# Patient Record
Sex: FEMALE | Race: WHITE | NOT HISPANIC OR LATINO | Employment: FULL TIME | URBAN - METROPOLITAN AREA
[De-identification: names, ages, dates, MRNs, and addresses within clinical notes are randomized per-mention and may not be internally consistent; named-entity substitution may affect disease eponyms.]

---

## 2017-10-02 ENCOUNTER — ALLSCRIPTS OFFICE VISIT (OUTPATIENT)
Dept: OTHER | Facility: OTHER | Age: 52
End: 2017-10-02

## 2017-10-03 NOTE — PROGRESS NOTES
Assessment  1  Acute sinusitis (461 9) (J01 90)    Plan  Acute sinusitis    · Cefuroxime Axetil 500 MG Oral Tablet; TAKE 1 TABLET EVERY 12 HOURS DAILY   · Follow-up PRN Evaluation and Treatment  Follow-up  Status: Complete  Done:  [id number]    Discussion/Summary    Nelle Fleischer is stable on exam  She is to f/u PRN  will switch her from Doxycycline to Ceftin x 10 days at this time; she can take OTC Cough and Cold Preps PRN, is to rest, hydrate well, etc    The patient was counseled regarding instructions for management,-risk factor reductions,-impressions,-importance of compliance with treatment  Possible side effects of new medications were reviewed with the patient/guardian today  The treatment plan was reviewed with the patient/guardian  The patient/guardian understands and agrees with the treatment plan      Chief Complaint  PT is being seen today due to having chest congestion, headache, ears clogged, lost appetite, stomach upset  some left over Doxycycline that she has at home, and not improved  She is dating a new boyfriend, exercising, eating healthier, losing weight, 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 - doing well  Just returned from a trip to [facility]  HISTORY of anaphylactic reactions with PCN; only some mild GI upset  History of Present Illness  HPI: As above  Review of Systems    Constitutional: as noted in HPI    ENT: as noted in HPI  Respiratory: as noted in HPI  Active Problems  1  Abdominal pain, lower (789 09) (R10 30)   2  Acute recurrent maxillary sinusitis (461 0) (J01 01)   3  Allergic rhinitis (477 9) (J30 9)   4  Back pain (724 5) (M54 9)   5  Hypertension (401 9) (I10)   6  Low back pain (724 2) (M54 5)   7  Osteoarthritis of both knees (715 96) (M17 0)   8  Sore throat (462) (J02 9)    Past Medical History  Active Problems And Past Medical History Reviewed: The active problems and past medical history were reviewed and updated today        Social History   · Never a smoker   · Social alcohol use (Z78 9)    Current Meds   1  Avonex Pen 30 MCG/0 5ML KIT; Therapy: (Recorded:30Urd5963) to Recorded   2  DiazePAM 5 MG Oral Tablet; TAKE 1 TABLET 3 TIMES DAILY AS NEEDED; Therapy: 74LGI8710 to (Evaluate:92Ajr6285); Last Rx:[id number] Ordered   3  Epinastine HCl - 0 05 % Ophthalmic Solution; INSTILL 2 DROP Twice daily; Therapy: (Sandra Care) to Recorded   4  Fish Oil 1000 MG Oral Capsule; TAKE 1 CAPSULE DAILY; Therapy: (Sandra Care) to Recorded   5  Glucosamine Chondr 1500 Complx Oral Capsule; TAKE 1 CAPSULE DAILY; Therapy: (Recorded:92Zwz0025) to Recorded   6  Losartan Potassium 50 MG Oral Tablet; TAKE 1 TABLET DAILY  Requested for:   50XXR1727; Last Rx:96Kjd1521 Ordered   7  Mometasone Furoate 50 MCG/ACT Nasal Suspension; USE 2 SPRAYS IN EACH   NOSTRIL DAILY IN THE MORNING (ON MFQ BACKORDER); Therapy: 89VKB8341 to (Anitha Sen)  Requested for: 22MJC2797; Last   Rx:[id number] Ordered   8  Montelukast Sodium 10 MG Oral Tablet; take 1 tablet by mouth every day; Therapy: 24IDB8142 to (Evaluate:58Wro8059)  Requested for: 74XYC9131; Last   Rx:75Inj2952 Ordered   9  Nasonex 50 MCG/ACT Nasal Suspension; USE 2 SPRAYS IN EACH NOSTRIL ONCE   DAILY; Therapy: (Sandra Care) to Recorded   10  ProAir  (90 Base) MCG/ACT Inhalation Aerosol Solution; INHALE 2 PUFFS    EVERY 4-6 HOURS AS NEEDED; Therapy: (Sandra Care) to Recorded   11  Vitamin B-12 1000 MCG Oral Tablet; TAKE 1 TABLET BY MOUTH DAILY AT 8AM    (SUPPLEMENT); Therapy: (Sandra Care) to Recorded   12  Vitamin C 1000 MG Oral Tablet; TAKE 1 TABLET DAILY; Therapy: (Sandra Care) to Recorded   13  Vitamin D 2000 UNIT Oral Capsule; TAKE 1 CAPSULE Every morning; Therapy: (Sandra Care) to Recorded   14  Zegerid  MG Oral Capsule; TAKE 1 CAPSULE DAILY; Therapy: (Recorded:23Ahu5841) to Recorded    The medication list was reviewed and updated today  Allergies  1  Azithromycin TABS   2  Bactrim   3  Levaquin   4  Penicillins  5  No Known Food Allergies    Vitals   Recorded: 60QKF5129 11:15AM   Temperature 97 1 F   Heart Rate 68   Respiration 16   Systolic 894   Diastolic 90   Height 5 ft 7 in   Weight 216 lb 4 oz   BMI Calculated 33 87   BSA Calculated 2 09     Physical Exam    Constitutional   General appearance: No acute distress, well appearing and well nourished  -NAD; pleasant  Eyes   Conjunctiva and lids: No swelling, erythema or discharge  Ears, Nose, Mouth, and Throat   External inspection of ears and nose: Normal     Otoscopic examination: Tympanic membranes translucent with normal light reflex  Canals patent without erythema  Nasal mucosa, septum, and turbinates: Abnormal  -NM boggy, with TTP over the bilateral maxillary sinuses  Oropharynx: Normal with no erythema, edema, exudate or lesions  Pulmonary   Respiratory effort: No increased work of breathing or signs of respiratory distress  Auscultation of lungs: Clear to auscultation  Cardiovascular   Auscultation of heart: Normal rate and rhythm, normal S1 and S2, without murmurs  Lymphatic   Palpation of lymph nodes in neck: No lymphadenopathy      Musculoskeletal   Gait and station: Normal     Psychiatric   Orientation to person, place, and time: Normal     Mood and affect: Normal          Signatures   Electronically signed by : Mookie Cmapa DO; Oct  2 2017 12:21PM EST                       (Author)

## 2017-11-13 ENCOUNTER — GENERIC CONVERSION - ENCOUNTER (OUTPATIENT)
Dept: OTHER | Facility: OTHER | Age: 52
End: 2017-11-13

## 2017-12-20 ENCOUNTER — ALLSCRIPTS OFFICE VISIT (OUTPATIENT)
Dept: OTHER | Facility: OTHER | Age: 52
End: 2017-12-20

## 2017-12-21 NOTE — PROGRESS NOTES
Assessment   1  Acute recurrent maxillary sinusitis (461 0) (J01 01)    Plan   Acute recurrent maxillary sinusitis    · Doxycycline Monohydrate 100 MG Oral Capsule; TAKE 1 CAPSULE TWICE DAILY    WITH FOOD   · Follow-up PRN Evaluation and Treatment  Follow-up  Status: Complete  Done:    [id number]    Discussion/Summary      Suhail Rodriguez is stable on exam  She is to f/u PRN  treat at this time with Doxycycline x 10 days, OTC Cough and Cold Preps PRN, rest, and good PO hydration  The patient was counseled regarding instructions for management,-- impressions,-- importance of compliance with treatment  Possible side effects of new medications were reviewed with the patient/guardian today  The treatment plan was reviewed with the patient/guardian  The patient/guardian understands and agrees with the treatment plan      Chief Complaint   PT is being seen today due to having lost of appetite, nausea, headache, tired, teeth hurt, nasal congestion, chest congestion, X 6 days  sick contacts at work  fevers  History of Present Illness   HPI: As above  Review of Systems        Constitutional: as noted in HPI       ENT: as noted in HPI  Respiratory: as noted in HPI  Active Problems   1  Abdominal pain, lower (789 09) (R10 30)   2  Acute recurrent maxillary sinusitis (461 0) (J01 01)   3  Acute sinusitis (461 9) (J01 90)   4  Allergic rhinitis (477 9) (J30 9)   5  Back pain (724 5) (M54 9)   6  Hypertension (401 9) (I10)   7  Low back pain (724 2) (M54 5)   8  Osteoarthritis of both knees (715 96) (M17 0)   9  Sore throat (462) (J02 9)    Past Medical History   Active Problems And Past Medical History Reviewed: The active problems and past medical history were reviewed and updated today  Social History    · Never a smoker   · Social alcohol use (Z78 9)    Current Meds    1  Avonex Pen 30 MCG/0 5ML KIT; Therapy: (Recorded:99Ywo0304) to Recorded   2   Cefuroxime Axetil 500 MG Oral Tablet; TAKE 1 TABLET EVERY 12 HOURS DAILY; Therapy: 03BYX9574 to (Evaluate:09Awo9256)  Requested for: 96Uay4056; Last     Rx:[id number] Ordered   3  DiazePAM 5 MG Oral Tablet; TAKE 1 TABLET 3 TIMES DAILY AS NEEDED; Therapy: 51OPB6712 to (Evaluate:57Hmg3318); Last Rx:[id number] Ordered   4  Epinastine HCl - 0 05 % Ophthalmic Solution; INSTILL 2 DROP Twice daily; Therapy: (Yolanda Irons) to Recorded   5  Fish Oil 1000 MG Oral Capsule; TAKE 1 CAPSULE DAILY; Therapy: (Yolanda Irons) to Recorded   6  Glucosamine Chondr 1500 Complx Oral Capsule; TAKE 1 CAPSULE DAILY; Therapy: (Recorded:33Baz8222) to Recorded   7  Losartan Potassium 50 MG Oral Tablet; TAKE 1 TABLET DAILY  Requested for:     98CCB5447; Last Rx:61Vbd3830 Ordered   8  Mometasone Furoate 50 MCG/ACT Nasal Suspension; USE 2 SPRAYS IN EACH     NOSTRIL DAILY IN THE MORNING (ON MFQ BACKORDER); Therapy: 44VTZ3314 to (Nell Laser)  Requested for: 21HAZ5052; Last     Rx:[id number] Ordered   9  Montelukast Sodium 10 MG Oral Tablet; take 1 tablet by mouth every day; Therapy: 00YMT2433 to (Evaluate:49Mbn2670)  Requested for: 65Aqr9414; Last     Rx:[id number] Ordered   10  Nasonex 50 MCG/ACT Nasal Suspension; USE 2 SPRAYS IN EACH NOSTRIL ONCE      DAILY; Therapy: (Yolanda Irons) to Recorded   11  ProAir  (90 Base) MCG/ACT Inhalation Aerosol Solution; INHALE 2 PUFFS      EVERY 4-6 HOURS AS NEEDED; Therapy: (Yolanda Irons) to Recorded   12  Vitamin B-12 1000 MCG Oral Tablet; TAKE 1 TABLET BY MOUTH DAILY AT 8AM      (SUPPLEMENT); Therapy: (Yolanda Irons) to Recorded   13  Vitamin C 1000 MG Oral Tablet; TAKE 1 TABLET DAILY; Therapy: (Yolanda Irons) to Recorded   14  Vitamin D 2000 UNIT Oral Capsule; TAKE 1 CAPSULE Every morning; Therapy: (Yolanda Irons) to Recorded   15  Zegerid  MG Oral Capsule; TAKE 1 CAPSULE DAILY;       Therapy: (Recorded:38Lyt6685) to Recorded     The medication list was reviewed and updated today  Allergies   1  Azithromycin TABS   2  Bactrim   3  Levaquin   4  Penicillins  5  No Known Food Allergies    Vitals    Recorded: 56Tcd0753 01:20PM   Temperature 97 7 F   Heart Rate 80   Respiration 16   Systolic 739   Diastolic 90   Height Unobtainable Yes   Weight Unobtainable Yes     Physical Exam        Constitutional      General appearance: No acute distress, well appearing and well nourished  -- NAD; pleasant  Eyes      Conjunctiva and lids: No swelling, erythema or discharge  Ears, Nose, Mouth, and Throat      External inspection of ears and nose: Normal        Otoscopic examination: Tympanic membranes translucent with normal light reflex  Canals patent without erythema  Nasal mucosa, septum, and turbinates: Abnormal  -- NM boggy, with TTP over the maxillary sinuses  Oropharynx: Normal with no erythema, edema, exudate or lesions  Pulmonary      Respiratory effort: No increased work of breathing or signs of respiratory distress  Auscultation of lungs: Clear to auscultation  Cardiovascular      Auscultation of heart: Normal rate and rhythm, normal S1 and S2, without murmurs  Lymphatic      Palpation of lymph nodes in neck: No lymphadenopathy         Musculoskeletal      Gait and station: Normal        Psychiatric      Orientation to person, place, and time: Normal        Mood and affect: Normal           Signatures    Electronically signed by : Dominic Hernandez DO; Dec 20 2017  2:16PM EST                       (Author)

## 2018-01-10 NOTE — RESULT NOTES
Verified Results  (1) THROAT CULTURE (CULTURE, UPPER RESPIRATORY) [id number] 12:00AM Felipe Granado     Test Name Result Flag Reference   Upper Respiratory Culture Final report     Result 1 Comment     Routine respiratory cristiane

## 2018-01-13 VITALS
WEIGHT: 216.25 LBS | BODY MASS INDEX: 33.94 KG/M2 | HEART RATE: 68 BPM | TEMPERATURE: 97.1 F | RESPIRATION RATE: 16 BRPM | SYSTOLIC BLOOD PRESSURE: 122 MMHG | HEIGHT: 67 IN | DIASTOLIC BLOOD PRESSURE: 90 MMHG

## 2018-01-23 ENCOUNTER — ALLSCRIPTS OFFICE VISIT (OUTPATIENT)
Dept: OTHER | Facility: OTHER | Age: 53
End: 2018-01-23

## 2018-01-23 VITALS
SYSTOLIC BLOOD PRESSURE: 140 MMHG | DIASTOLIC BLOOD PRESSURE: 90 MMHG | HEART RATE: 80 BPM | TEMPERATURE: 97.7 F | RESPIRATION RATE: 16 BRPM

## 2018-01-24 NOTE — MISCELLANEOUS
Message  Return to work or school:   Frannie Navarrete is under my professional care  She was seen in my office on 01/23/2018   She is able to return to work on  01/26/2018      PATIENT WAS SEEN IN OUR OFFICE 01/23/2018 MAY RETURN TO WORK 01/26/2018  DR JAMIE BRAR / LIZZETTE  Signatures   Electronically signed by :  Valorie García, ; Jan 23 2018  2:31PM EST                       (Author)

## 2018-01-29 ENCOUNTER — OFFICE VISIT (OUTPATIENT)
Dept: FAMILY MEDICINE CLINIC | Facility: CLINIC | Age: 53
End: 2018-01-29
Payer: COMMERCIAL

## 2018-01-29 VITALS
WEIGHT: 210.8 LBS | RESPIRATION RATE: 16 BRPM | SYSTOLIC BLOOD PRESSURE: 112 MMHG | DIASTOLIC BLOOD PRESSURE: 60 MMHG | HEART RATE: 80 BPM | BODY MASS INDEX: 33.02 KG/M2

## 2018-01-29 DIAGNOSIS — J01.01 ACUTE RECURRENT MAXILLARY SINUSITIS: Primary | ICD-10-CM

## 2018-01-29 DIAGNOSIS — J11.1 INFLUENZA: ICD-10-CM

## 2018-01-29 PROCEDURE — 99213 OFFICE O/P EST LOW 20 MIN: CPT | Performed by: FAMILY MEDICINE

## 2018-01-29 RX ORDER — OSELTAMIVIR PHOSPHATE 75 MG/1
1 CAPSULE ORAL 2 TIMES DAILY
COMMUNITY
Start: 2018-01-23 | End: 2019-08-26 | Stop reason: ALTCHOICE

## 2018-01-29 RX ORDER — EAR PLUGS
EACH OTIC (EAR)
COMMUNITY
End: 2022-02-14 | Stop reason: ALTCHOICE

## 2018-01-29 RX ORDER — MONTELUKAST SODIUM 10 MG/1
10 TABLET ORAL DAILY
Refills: 3 | COMMUNITY
Start: 2017-12-25 | End: 2019-08-26 | Stop reason: ALTCHOICE

## 2018-01-29 RX ORDER — ALBUTEROL SULFATE 90 UG/1
2 AEROSOL, METERED RESPIRATORY (INHALATION)
COMMUNITY
End: 2019-08-26 | Stop reason: ALTCHOICE

## 2018-01-29 RX ORDER — MOMETASONE FUROATE 50 UG/1
2 SPRAY, METERED NASAL DAILY
COMMUNITY
End: 2018-11-14 | Stop reason: SDUPTHER

## 2018-01-29 RX ORDER — EPINASTINE HCL 0.05 %
2 DROPS OPHTHALMIC (EYE) 2 TIMES DAILY
COMMUNITY

## 2018-01-29 RX ORDER — LOSARTAN POTASSIUM 50 MG/1
50 TABLET ORAL DAILY
Refills: 3 | COMMUNITY
Start: 2017-10-23 | End: 2018-02-19 | Stop reason: SDUPTHER

## 2018-01-29 RX ORDER — AZELASTINE 1 MG/ML
SPRAY, METERED NASAL
Refills: 11 | COMMUNITY
Start: 2017-11-24 | End: 2019-12-19 | Stop reason: SDUPTHER

## 2018-01-29 RX ORDER — INTERFERON BETA-1A 30MCG/.5ML
KIT INTRAMUSCULAR
COMMUNITY
Start: 2017-12-24

## 2018-01-29 RX ORDER — CEFUROXIME AXETIL 500 MG/1
1 TABLET ORAL EVERY 12 HOURS
COMMUNITY
Start: 2017-10-02 | End: 2018-01-29 | Stop reason: SDUPTHER

## 2018-01-29 RX ORDER — OMEPRAZOLE AND SODIUM BICARBONATE 40; 1100 MG/1; MG/1
1 CAPSULE ORAL DAILY
COMMUNITY
End: 2019-08-26 | Stop reason: ALTCHOICE

## 2018-01-29 RX ORDER — MULTIVIT WITH MINERALS/LUTEIN
1 TABLET ORAL DAILY
COMMUNITY
End: 2022-02-14 | Stop reason: ALTCHOICE

## 2018-01-29 RX ORDER — MULTIVIT-MIN/IRON/FOLIC ACID/K 18-600-40
1 CAPSULE ORAL
COMMUNITY
End: 2022-02-14 | Stop reason: ALTCHOICE

## 2018-01-29 RX ORDER — CEFUROXIME AXETIL 500 MG/1
500 TABLET ORAL EVERY 12 HOURS
Qty: 20 TABLET | Refills: 0 | Status: SHIPPED | OUTPATIENT
Start: 2018-01-29 | End: 2018-02-08

## 2018-01-29 RX ORDER — CHLORAL HYDRATE 500 MG
1 CAPSULE ORAL DAILY
COMMUNITY
End: 2022-02-14 | Stop reason: ALTCHOICE

## 2018-01-29 RX ORDER — DIAZEPAM 2 MG/1
TABLET ORAL
Refills: 2 | COMMUNITY
Start: 2018-01-14

## 2018-01-29 RX ORDER — MOMETASONE FUROATE 50 UG/1
SPRAY, METERED NASAL
Refills: 3 | COMMUNITY
Start: 2017-12-01 | End: 2019-08-26 | Stop reason: SDUPTHER

## 2018-01-29 RX ORDER — MONTELUKAST SODIUM 10 MG/1
1 TABLET ORAL DAILY
COMMUNITY
Start: 2016-05-10 | End: 2019-08-26 | Stop reason: ALTCHOICE

## 2018-01-29 RX ORDER — ESCITALOPRAM OXALATE 10 MG/1
20 TABLET ORAL DAILY
Refills: 0 | COMMUNITY
Start: 2017-11-25 | End: 2019-08-26 | Stop reason: ALTCHOICE

## 2018-01-29 NOTE — ASSESSMENT & PLAN NOTE
Post-influenza  Will treat again at this time with Ceftin x 10 days (has taken, and tolerated well previously), and OTC Cough and Cold preps PRN

## 2018-01-29 NOTE — ASSESSMENT & PLAN NOTE
Duke Walker is aware that the flu can take 1 - 2 weeks+ occasionally to make a full recovery  She is to continue to rest, hydrate well, and keep her diet bland for a few days (advancing slowly)  A note was provided for work

## 2018-01-29 NOTE — PROGRESS NOTES
Assessment/Plan:    Acute recurrent maxillary sinusitis  Post-influenza  Will treat again at this time with Ceftin x 10 days (has taken, and tolerated well previously), and OTC Cough and Cold preps PRN  Influenza  Resolving  Roxy Vega is aware that the flu can take 1 - 2 weeks+ occasionally to make a full recovery  She is to continue to rest, hydrate well, and keep her diet bland for a few days (advancing slowly)  A note was provided for work  Diagnoses and all orders for this visit:    Acute recurrent maxillary sinusitis  -     cefuroxime (CEFTIN) 500 mg tablet; Take 1 tablet (500 mg total) by mouth every 12 (twelve) hours for 10 days    Influenza    Other orders  -     omeprazole-sodium bicarbonate (ZEGERID)  MG per capsule; Take 1 capsule by mouth daily  -     Cholecalciferol (VITAMIN D) 2000 units CAPS; Take 1 capsule by mouth  -     Ascorbic Acid (VITAMIN C) 1000 MG tablet; Take 1 tablet by mouth daily  -     Cyanocobalamin (VITAMIN B-12) 1000 MCG/15ML LIQD; Take by mouth  -     albuterol (PROAIR HFA) 90 mcg/act inhaler; Inhale 2 puffs  -     oseltamivir (TAMIFLU) 75 mg capsule; Take 1 capsule by mouth Twice daily  -     mometasone (NASONEX) 50 mcg/act nasal spray; 2 sprays into each nostril daily  -     montelukast (SINGULAIR) 10 mg tablet; Take 1 tablet by mouth daily  -     montelukast (SINGULAIR) 10 mg tablet; Take 10 mg by mouth daily  -     mometasone (NASONEX) 50 mcg/act nasal spray; USE 2 SPRAYS IN EACH NOSTRIL DAILY IN THE MORNING  -     losartan (COZAAR) 50 mg tablet; Take 50 mg by mouth daily  -     AVONEX PREFILLED 30 MCG/0 5ML PSKT;   -     Glucosamine-Chondroit-Vit C-Mn (GLUCOSAMINE CHONDR 1500 COMPLX PO); Take 1 capsule by mouth daily  -     Omega-3 Fatty Acids (FISH OIL) 1,000 mg; Take 1 capsule by mouth daily  -     escitalopram (LEXAPRO) 10 mg tablet; Take 10 mg by mouth daily  -     Epinastine HCl 0 05 % ophthalmic solution;  Apply 2 drops to eye 2 (two) times a day  - diazepam (VALIUM) 2 mg tablet; TAKE 1/2 TABLET BY MOUTH EVERY MORNING PLUS 1 TAB AT BEDTIME  -     Interferon Beta-1a (AVONEX PEN) 30 MCG/0 5ML AJKT; Inject into the shoulder, thigh, or buttocks  -     azelastine (ASTELIN) 0 1 % nasal spray; USE 2 SPRAYS IN EACH NOSTRIL TWICE A DAY AS NEEDED  -     Discontinue: cefuroxime (CEFTIN) 500 mg tablet; Take 1 tablet by mouth every 12 (twelve) hours          Subjective:      Patient ID: Kianna Leahy is a 47 y o  female  Pt was treated for influenza 6 days ago -> only tolerated 5 doses of Tamiflu  Has had fatigue, muscle pains, diarrhea, headaches  Sinuses hurt now  Fevers resolved  Light sensitivity  Dizziness, body "heaviness "    No chills or fever now  The following portions of the patient's history were reviewed and updated as appropriate: past medical history  Review of Systems   Constitutional: Positive for fatigue  Negative for fever  HENT: Positive for sinus pain  Eyes: Positive for photophobia  Respiratory: Positive for cough and shortness of breath  Gastrointestinal: Positive for diarrhea  Genitourinary: Negative for dysuria and hematuria  Musculoskeletal: Positive for myalgias  No past medical history on file  Social History     Social History    Marital status:      Spouse name: N/A    Number of children: N/A    Years of education: N/A     Occupational History    Not on file  Social History Main Topics    Smoking status: Not on file    Smokeless tobacco: Not on file    Alcohol use Not on file    Drug use: Unknown    Sexual activity: Not on file     Other Topics Concern    Not on file     Social History Narrative    No narrative on file       Objective:     Physical Exam   Constitutional: She is oriented to person, place, and time  She appears well-developed and well-nourished  No distress     Shaunna Justice appears a little tired and pale today, but is NON-toxic appearing; she is speaking in full sentences  HENT:   Head: Normocephalic and atraumatic  Right Ear: External ear normal    Left Ear: External ear normal    Nose: Mucosal edema present  Right sinus exhibits maxillary sinus tenderness  Left sinus exhibits maxillary sinus tenderness  Mouth/Throat: Oropharynx is clear and moist  No oropharyngeal exudate  Eyes: Conjunctivae are normal    Neck: Normal range of motion  Neck supple  No thyromegaly present  Cardiovascular: Normal rate, regular rhythm and normal heart sounds  Exam reveals no gallop and no friction rub  No murmur heard  Pulmonary/Chest: Effort normal and breath sounds normal  No respiratory distress  She has no wheezes  She has no rales  She exhibits no tenderness  Abdominal: Soft  Bowel sounds are normal  She exhibits no distension and no mass  There is no rebound and no guarding  Mild TTP in the RUQ and LUQ, but no rebound TTP  Lymphadenopathy:     She has no cervical adenopathy  Neurological: She is alert and oriented to person, place, and time  Skin: She is not diaphoretic  Psychiatric: She has a normal mood and affect   Her behavior is normal  Judgment and thought content normal        Vitals:    01/29/18 1503   BP: 112/60   Pulse: 80   Resp: 16

## 2018-02-02 ENCOUNTER — TELEPHONE (OUTPATIENT)
Dept: FAMILY MEDICINE CLINIC | Facility: CLINIC | Age: 53
End: 2018-02-02

## 2018-02-02 NOTE — TELEPHONE ENCOUNTER
Likely nothing - just monitor for any new, ongoing symptoms, finish the antibiotic, rest, and hydrate well  Thanks    Lizbeth

## 2018-02-02 NOTE — TELEPHONE ENCOUNTER
Spoke to pt regarding suggestions  Will finishe ATB, rest, and hydrate - will work from home next week and F/U with us on how she's feeling

## 2018-02-19 DIAGNOSIS — I15.9 SECONDARY HYPERTENSION: Primary | ICD-10-CM

## 2018-02-19 RX ORDER — LOSARTAN POTASSIUM 50 MG/1
50 TABLET ORAL DAILY
Qty: 30 TABLET | Refills: 5 | Status: SHIPPED | OUTPATIENT
Start: 2018-02-19 | End: 2018-07-08 | Stop reason: SDUPTHER

## 2018-03-09 ENCOUNTER — TELEPHONE (OUTPATIENT)
Dept: FAMILY MEDICINE CLINIC | Facility: CLINIC | Age: 53
End: 2018-03-09

## 2018-03-09 DIAGNOSIS — R53.83 FATIGUE, UNSPECIFIED TYPE: Primary | ICD-10-CM

## 2018-03-09 DIAGNOSIS — J11.1 INFLUENZA: ICD-10-CM

## 2018-03-19 ENCOUNTER — TELEPHONE (OUTPATIENT)
Dept: FAMILY MEDICINE CLINIC | Facility: CLINIC | Age: 53
End: 2018-03-19

## 2018-07-08 DIAGNOSIS — I15.9 SECONDARY HYPERTENSION: ICD-10-CM

## 2018-07-09 RX ORDER — LOSARTAN POTASSIUM 50 MG/1
50 TABLET ORAL DAILY
Qty: 30 TABLET | Refills: 4 | Status: SHIPPED | OUTPATIENT
Start: 2018-07-09 | End: 2018-10-05 | Stop reason: SDUPTHER

## 2018-09-05 DIAGNOSIS — R79.89 ELEVATED SERUM CREATININE: ICD-10-CM

## 2018-09-05 DIAGNOSIS — E53.8 B12 DEFICIENCY: ICD-10-CM

## 2018-09-05 DIAGNOSIS — R53.83 FATIGUE, UNSPECIFIED TYPE: ICD-10-CM

## 2018-09-05 DIAGNOSIS — E55.9 VITAMIN D DEFICIENCY: ICD-10-CM

## 2018-09-05 DIAGNOSIS — D64.9 ANEMIA, UNSPECIFIED TYPE: ICD-10-CM

## 2018-09-05 DIAGNOSIS — Z13.1 SCREENING FOR DIABETES MELLITUS: Primary | ICD-10-CM

## 2018-09-27 ENCOUNTER — OFFICE VISIT (OUTPATIENT)
Dept: FAMILY MEDICINE CLINIC | Facility: CLINIC | Age: 53
End: 2018-09-27
Payer: COMMERCIAL

## 2018-09-27 VITALS
DIASTOLIC BLOOD PRESSURE: 82 MMHG | TEMPERATURE: 97.6 F | SYSTOLIC BLOOD PRESSURE: 122 MMHG | OXYGEN SATURATION: 98 % | BODY MASS INDEX: 32.01 KG/M2 | WEIGHT: 204.4 LBS | RESPIRATION RATE: 16 BRPM | HEART RATE: 74 BPM

## 2018-09-27 DIAGNOSIS — I10 ESSENTIAL HYPERTENSION: Chronic | ICD-10-CM

## 2018-09-27 DIAGNOSIS — D50.9 IRON DEFICIENCY ANEMIA, UNSPECIFIED IRON DEFICIENCY ANEMIA TYPE: Primary | ICD-10-CM

## 2018-09-27 DIAGNOSIS — R63.5 WEIGHT GAIN: Chronic | ICD-10-CM

## 2018-09-27 PROCEDURE — 99214 OFFICE O/P EST MOD 30 MIN: CPT | Performed by: FAMILY MEDICINE

## 2018-09-27 NOTE — PROGRESS NOTES
Assessment/Plan:    No problem-specific Assessment & Plan notes found for this encounter  Diagnoses and all orders for this visit:    Iron deficiency anemia, unspecified iron deficiency anemia type  Comments:  Post irreg menses, surgery - feeling better now though  Checking CBC and Ferritin  Pt reassured that her rash will fade away soon  Orders:  -     CBC and differential; Future  -     Ferritin; Future    Weight gain  Comments:  Checking TSH at this time  Pt to work on a lower carb diet, and regular exercise (already cleared by GYN)  Rechecking CMP/Cr/GFR - suspect due to NSAIDs  Orders:  -     CBC and differential; Future  -     TSH, 3rd generation with Free T4 reflex; Future    Essential hypertension  Comments:  Controlled on present management  Diet and exercise as above  Pt to avoid using NSAIDs and hydrate well - rechecking labs  Orders:  -     Comprehensive metabolic panel; Future  -     Lipid Panel with Direct LDL reflex; Future          Subjective:      Patient ID: Satnam Ahumada is a 47 y o  female  Citlali Gain just had issues with excessive vaginal bleeding  Her OB/GYN performed a D&C / Hysteroscope - had a large cervical; polyp with infection in the uterine cavity as well  Now doing better  Labs - H/H (mild anemia) at 11 1/34 3, Cr/GFR 1 32/42  She is feeling better  Had been taking a lot of OTC Aleve though, prior to her surgery  The following portions of the patient's history were reviewed and updated as appropriate: allergies, current medications, past family history, past social history, past surgical history and problem list     History reviewed  No pertinent past medical history  History reviewed  No pertinent surgical history        Current Outpatient Prescriptions:     albuterol (PROAIR HFA) 90 mcg/act inhaler, Inhale 2 puffs, Disp: , Rfl:     Ascorbic Acid (VITAMIN C) 1000 MG tablet, Take 1 tablet by mouth daily, Disp: , Rfl:     AVONEX PREFILLED 30 MCG/0 5ML PSKT, , Disp: , Rfl:     azelastine (ASTELIN) 0 1 % nasal spray, USE 2 SPRAYS IN EACH NOSTRIL TWICE A DAY AS NEEDED, Disp: , Rfl: 11    Cholecalciferol (VITAMIN D) 2000 units CAPS, Take 1 capsule by mouth, Disp: , Rfl:     Cyanocobalamin (VITAMIN B-12) 1000 MCG/15ML LIQD, Take by mouth, Disp: , Rfl:     diazepam (VALIUM) 2 mg tablet, TAKE 1/2 TABLET BY MOUTH EVERY MORNING PLUS 1 TAB AT BEDTIME, Disp: , Rfl: 2    Epinastine HCl 0 05 % ophthalmic solution, Apply 2 drops to eye 2 (two) times a day, Disp: , Rfl:     escitalopram (LEXAPRO) 10 mg tablet, Take 10 mg by mouth daily, Disp: , Rfl: 0    Glucosamine-Chondroit-Vit C-Mn (GLUCOSAMINE CHONDR 1500 COMPLX PO), Take 1 capsule by mouth daily, Disp: , Rfl:     Interferon Beta-1a (AVONEX PEN) 30 MCG/0 5ML AJKT, Inject into the shoulder, thigh, or buttocks, Disp: , Rfl:     losartan (COZAAR) 50 mg tablet, TAKE 1 TABLET (50 MG TOTAL) BY MOUTH DAILY, Disp: 30 tablet, Rfl: 4    mometasone (NASONEX) 50 mcg/act nasal spray, 2 sprays into each nostril daily, Disp: , Rfl:     mometasone (NASONEX) 50 mcg/act nasal spray, USE 2 SPRAYS IN EACH NOSTRIL DAILY IN THE MORNING, Disp: , Rfl: 3    montelukast (SINGULAIR) 10 mg tablet, Take 1 tablet by mouth daily, Disp: , Rfl:     montelukast (SINGULAIR) 10 mg tablet, Take 10 mg by mouth daily, Disp: , Rfl: 3    Omega-3 Fatty Acids (FISH OIL) 1,000 mg, Take 1 capsule by mouth daily, Disp: , Rfl:     omeprazole-sodium bicarbonate (ZEGERID)  MG per capsule, Take 1 capsule by mouth daily, Disp: , Rfl:     oseltamivir (TAMIFLU) 75 mg capsule, Take 1 capsule by mouth Twice daily, Disp: , Rfl:     Allergies   Allergen Reactions    Azithromycin GI Intolerance    Codeine     Levofloxacin Hives    Penicillins GI Intolerance    Sulfamethoxazole-Trimethoprim Hives    Tamiflu [Oseltamivir] GI Intolerance         Review of Systems   Constitutional: Negative for activity change     Respiratory: Negative for shortness of breath  Cardiovascular: Negative for chest pain  Gastrointestinal: Negative for abdominal pain and blood in stool  Genitourinary: Positive for menstrual problem  Objective:      /82 (BP Location: Right arm, Patient Position: Sitting, Cuff Size: Large)   Pulse 74   Temp 97 6 °F (36 4 °C) (Tympanic)   Resp 16   Wt 92 7 kg (204 lb 6 4 oz)   SpO2 98%   BMI 32 01 kg/m²          Physical Exam   Constitutional: She is oriented to person, place, and time  She appears well-developed and well-nourished  No distress  Pt in NAD; speaking in full sentences  Just came off Doxycycline - has slight, fading, photosensitivity rash to upper chest and bilateral forearms; no signs of secondary infection  HENT:   Head: Normocephalic and atraumatic  Right Ear: Hearing, tympanic membrane, external ear and ear canal normal    Left Ear: Hearing, tympanic membrane, external ear and ear canal normal    Mouth/Throat: Oropharynx is clear and moist  No oropharyngeal exudate  Eyes: Conjunctivae are normal    Neck: Normal range of motion  Neck supple  No thyromegaly present  Cardiovascular: Normal rate, regular rhythm and normal heart sounds  Exam reveals no gallop and no friction rub  No murmur heard  Pulmonary/Chest: Effort normal and breath sounds normal  No respiratory distress  She has no wheezes  She has no rales  Abdominal: Soft  Bowel sounds are normal  She exhibits no distension and no mass  There is no tenderness  There is no rebound and no guarding  Lymphadenopathy:     She has no cervical adenopathy  Neurological: She is alert and oriented to person, place, and time  Skin: She is not diaphoretic  Psychiatric: She has a normal mood and affect  Her behavior is normal  Judgment and thought content normal    Nursing note and vitals reviewed

## 2018-09-30 DIAGNOSIS — N18.30 CKD (CHRONIC KIDNEY DISEASE) STAGE 3, GFR 30-59 ML/MIN (HCC): Primary | ICD-10-CM

## 2018-09-30 LAB
ALBUMIN SERPL-MCNC: 4.2 G/DL (ref 3.5–5.5)
ALBUMIN/GLOB SERPL: 1.8 {RATIO} (ref 1.2–2.2)
ALP SERPL-CCNC: 73 IU/L (ref 39–117)
ALT SERPL-CCNC: 10 IU/L (ref 0–32)
AMBIG ABBREV DEFAULT: NORMAL
AST SERPL-CCNC: 18 IU/L (ref 0–40)
BASOPHILS # BLD AUTO: 0 X10E3/UL (ref 0–0.2)
BASOPHILS NFR BLD AUTO: 1 %
BILIRUB SERPL-MCNC: 0.3 MG/DL (ref 0–1.2)
BUN SERPL-MCNC: 17 MG/DL (ref 6–24)
BUN/CREAT SERPL: 12 (ref 9–23)
CALCIUM SERPL-MCNC: 9 MG/DL (ref 8.7–10.2)
CHLORIDE SERPL-SCNC: 106 MMOL/L (ref 96–106)
CHOLEST SERPL-MCNC: 203 MG/DL (ref 100–199)
CO2 SERPL-SCNC: 23 MMOL/L (ref 20–29)
CREAT SERPL-MCNC: 1.46 MG/DL (ref 0.57–1)
EOSINOPHIL # BLD AUTO: 0.1 X10E3/UL (ref 0–0.4)
EOSINOPHIL NFR BLD AUTO: 3 %
ERYTHROCYTE [DISTWIDTH] IN BLOOD BY AUTOMATED COUNT: 13.9 % (ref 12.3–15.4)
FERRITIN SERPL-MCNC: 219 NG/ML (ref 15–150)
GLOBULIN SER-MCNC: 2.4 G/DL (ref 1.5–4.5)
GLUCOSE SERPL-MCNC: 85 MG/DL (ref 65–99)
HCT VFR BLD AUTO: 35.7 % (ref 34–46.6)
HDLC SERPL-MCNC: 76 MG/DL
HGB BLD-MCNC: 11.2 G/DL (ref 11.1–15.9)
IMM GRANULOCYTES # BLD: 0 X10E3/UL (ref 0–0.1)
IMM GRANULOCYTES NFR BLD: 0 %
LDLC SERPL CALC-MCNC: 112 MG/DL (ref 0–99)
LDLC/HDLC SERPL: 1.5 RATIO (ref 0–3.2)
LYMPHOCYTES # BLD AUTO: 1.6 X10E3/UL (ref 0.7–3.1)
LYMPHOCYTES NFR BLD AUTO: 58 %
MCH RBC QN AUTO: 30.4 PG (ref 26.6–33)
MCHC RBC AUTO-ENTMCNC: 31.4 G/DL (ref 31.5–35.7)
MCV RBC AUTO: 97 FL (ref 79–97)
MONOCYTES # BLD AUTO: 0.2 X10E3/UL (ref 0.1–0.9)
MONOCYTES NFR BLD AUTO: 7 %
MORPHOLOGY BLD-IMP: ABNORMAL
NEUTROPHILS # BLD AUTO: 0.9 X10E3/UL (ref 1.4–7)
NEUTROPHILS NFR BLD AUTO: 31 %
PLATELET # BLD AUTO: 176 X10E3/UL (ref 150–379)
POTASSIUM SERPL-SCNC: 4.5 MMOL/L (ref 3.5–5.2)
PROT SERPL-MCNC: 6.6 G/DL (ref 6–8.5)
RBC # BLD AUTO: 3.69 X10E6/UL (ref 3.77–5.28)
SL AMB EGFR AFRICAN AMERICAN: 47 ML/MIN/1.73
SL AMB EGFR NON AFRICAN AMERICAN: 41 ML/MIN/1.73
SL AMB VLDL CHOLESTEROL CALC: 15 MG/DL (ref 5–40)
SODIUM SERPL-SCNC: 143 MMOL/L (ref 134–144)
TRIGL SERPL-MCNC: 74 MG/DL (ref 0–149)
TSH SERPL DL<=0.005 MIU/L-ACNC: 2.51 UIU/ML (ref 0.45–4.5)
WBC # BLD AUTO: 2.8 X10E3/UL (ref 3.4–10.8)

## 2018-10-05 DIAGNOSIS — I15.9 SECONDARY HYPERTENSION: ICD-10-CM

## 2018-10-05 RX ORDER — LOSARTAN POTASSIUM 50 MG/1
50 TABLET ORAL DAILY
Qty: 30 TABLET | Refills: 5 | Status: SHIPPED | OUTPATIENT
Start: 2018-10-05 | End: 2018-10-22 | Stop reason: SDUPTHER

## 2018-10-09 ENCOUNTER — TELEPHONE (OUTPATIENT)
Dept: FAMILY MEDICINE CLINIC | Facility: CLINIC | Age: 53
End: 2018-10-09

## 2018-10-09 NOTE — TELEPHONE ENCOUNTER
I would wait it out for now - Tylenol / Advil OTC is fine, rest, hydrate well  Worsening symptoms though, then go to ER  Thanks    Lizbeth

## 2018-10-22 ENCOUNTER — OFFICE VISIT (OUTPATIENT)
Dept: FAMILY MEDICINE CLINIC | Facility: CLINIC | Age: 53
End: 2018-10-22
Payer: COMMERCIAL

## 2018-10-22 VITALS
RESPIRATION RATE: 16 BRPM | DIASTOLIC BLOOD PRESSURE: 82 MMHG | HEART RATE: 65 BPM | BODY MASS INDEX: 30.95 KG/M2 | TEMPERATURE: 97.3 F | OXYGEN SATURATION: 98 % | SYSTOLIC BLOOD PRESSURE: 122 MMHG | WEIGHT: 197.6 LBS

## 2018-10-22 DIAGNOSIS — J01.01 ACUTE RECURRENT MAXILLARY SINUSITIS: Primary | ICD-10-CM

## 2018-10-22 DIAGNOSIS — I10 ESSENTIAL HYPERTENSION: Chronic | ICD-10-CM

## 2018-10-22 PROCEDURE — 99213 OFFICE O/P EST LOW 20 MIN: CPT | Performed by: FAMILY MEDICINE

## 2018-10-22 RX ORDER — LOSARTAN POTASSIUM 50 MG/1
50 TABLET ORAL DAILY
Qty: 90 TABLET | Refills: 3 | Status: SHIPPED | OUTPATIENT
Start: 2018-10-22 | End: 2019-09-30 | Stop reason: SDUPTHER

## 2018-10-22 RX ORDER — DOXYCYCLINE 100 MG/1
100 CAPSULE ORAL 2 TIMES DAILY
Qty: 20 CAPSULE | Refills: 0 | Status: SHIPPED | OUTPATIENT
Start: 2018-10-22 | End: 2018-10-23 | Stop reason: SINTOL

## 2018-10-22 NOTE — PROGRESS NOTES
Assessment/Plan:    No problem-specific Assessment & Plan notes found for this encounter  Diagnoses and all orders for this visit:    Acute recurrent maxillary sinusitis  Comments:  Treating at this time with Doxycycline; OTC Cough and Cold Preps PRN (Coricidin HBP), rest, and good PO hydration - likely contributing to nausea  Orders:  -     doxycycline monohydrate (MONODOX) 100 mg capsule; Take 1 capsule (100 mg total) by mouth 2 (two) times a day for 10 days    Essential hypertension  Comments:  Controlled on present management - Losartan refilled  Orders:  -     losartan (COZAAR) 50 mg tablet; Take 1 tablet (50 mg total) by mouth daily          Subjective:      Patient ID: Leeanna Soto is a 47 y o  female  Pt's boyfriend's daughter was recently ill, and Rachele Ovalleo was exposed  She is having issues with post nasal drip and nausea (pregnancy test at home she reports was just negative)  Has also developed a cough  The following portions of the patient's history were reviewed and updated as appropriate: allergies, current medications, past family history, past social history, past surgical history and problem list     No past medical history on file        Current Outpatient Prescriptions:     albuterol (PROAIR HFA) 90 mcg/act inhaler, Inhale 2 puffs, Disp: , Rfl:     Ascorbic Acid (VITAMIN C) 1000 MG tablet, Take 1 tablet by mouth daily, Disp: , Rfl:     AVONEX PREFILLED 30 MCG/0 5ML PSKT, , Disp: , Rfl:     azelastine (ASTELIN) 0 1 % nasal spray, USE 2 SPRAYS IN EACH NOSTRIL TWICE A DAY AS NEEDED, Disp: , Rfl: 11    Cholecalciferol (VITAMIN D) 2000 units CAPS, Take 1 capsule by mouth, Disp: , Rfl:     Cyanocobalamin (VITAMIN B-12) 1000 MCG/15ML LIQD, Take by mouth, Disp: , Rfl:     diazepam (VALIUM) 2 mg tablet, TAKE 1/2 TABLET BY MOUTH EVERY MORNING PLUS 1 TAB AT BEDTIME, Disp: , Rfl: 2    doxycycline monohydrate (MONODOX) 100 mg capsule, Take 1 capsule (100 mg total) by mouth 2 (two) times a day for 10 days, Disp: 20 capsule, Rfl: 0    Epinastine HCl 0 05 % ophthalmic solution, Apply 2 drops to eye 2 (two) times a day, Disp: , Rfl:     escitalopram (LEXAPRO) 10 mg tablet, Take 10 mg by mouth daily, Disp: , Rfl: 0    Glucosamine-Chondroit-Vit C-Mn (GLUCOSAMINE CHONDR 1500 COMPLX PO), Take 1 capsule by mouth daily, Disp: , Rfl:     Interferon Beta-1a (AVONEX PEN) 30 MCG/0 5ML AJKT, Inject into the shoulder, thigh, or buttocks, Disp: , Rfl:     losartan (COZAAR) 50 mg tablet, Take 1 tablet (50 mg total) by mouth daily, Disp: 90 tablet, Rfl: 3    mometasone (NASONEX) 50 mcg/act nasal spray, 2 sprays into each nostril daily, Disp: , Rfl:     mometasone (NASONEX) 50 mcg/act nasal spray, USE 2 SPRAYS IN EACH NOSTRIL DAILY IN THE MORNING, Disp: , Rfl: 3    montelukast (SINGULAIR) 10 mg tablet, Take 1 tablet by mouth daily, Disp: , Rfl:     montelukast (SINGULAIR) 10 mg tablet, Take 10 mg by mouth daily, Disp: , Rfl: 3    Omega-3 Fatty Acids (FISH OIL) 1,000 mg, Take 1 capsule by mouth daily, Disp: , Rfl:     omeprazole-sodium bicarbonate (ZEGERID)  MG per capsule, Take 1 capsule by mouth daily, Disp: , Rfl:     oseltamivir (TAMIFLU) 75 mg capsule, Take 1 capsule by mouth Twice daily, Disp: , Rfl:     Allergies   Allergen Reactions    Azithromycin GI Intolerance    Codeine     Levofloxacin Hives    Penicillins GI Intolerance    Sulfamethoxazole-Trimethoprim Hives    Tamiflu [Oseltamivir] GI Intolerance     Social History   Substance Use Topics    Smoking status: Not on file    Smokeless tobacco: Not on file    Alcohol use Not on file         Review of Systems   Constitutional: Positive for fatigue  Negative for activity change and fever  HENT: Positive for congestion, postnasal drip, sinus pressure and sore throat  Negative for tinnitus  Respiratory: Positive for cough  Gastrointestinal: Positive for diarrhea and nausea  Occasional loose stools  Objective:      /82 (BP Location: Right arm, Patient Position: Sitting)   Pulse 65   Temp (!) 97 3 °F (36 3 °C) (Tympanic)   Resp 16   Wt 89 6 kg (197 lb 9 6 oz)   SpO2 98%   BMI 30 95 kg/m²          Physical Exam   Constitutional: She is oriented to person, place, and time  She appears well-developed and well-nourished  No distress  HENT:   Head: Normocephalic and atraumatic  Right Ear: Hearing, tympanic membrane, external ear and ear canal normal    Left Ear: Hearing, tympanic membrane, external ear and ear canal normal    Nose: Mucosal edema present  Right sinus exhibits maxillary sinus tenderness  Left sinus exhibits maxillary sinus tenderness  Mouth/Throat: Oropharynx is clear and moist  No oropharyngeal exudate  Eyes: Conjunctivae are normal    Neck: Normal range of motion  Neck supple  No thyromegaly present  Cardiovascular: Normal rate, regular rhythm and normal heart sounds  Exam reveals no gallop and no friction rub  No murmur heard  Pulmonary/Chest: Effort normal and breath sounds normal  No respiratory distress  She has no wheezes  She has no rales  Abdominal: Soft  Bowel sounds are normal  She exhibits no distension and no mass  There is no tenderness  There is no rebound and no guarding  Lymphadenopathy:     She has no cervical adenopathy  Neurological: She is alert and oriented to person, place, and time  Skin: She is not diaphoretic  Psychiatric: She has a normal mood and affect  Her behavior is normal  Judgment and thought content normal    Nursing note and vitals reviewed

## 2018-10-23 ENCOUNTER — TELEPHONE (OUTPATIENT)
Dept: FAMILY MEDICINE CLINIC | Facility: CLINIC | Age: 53
End: 2018-10-23

## 2018-10-23 DIAGNOSIS — J01.01 ACUTE RECURRENT MAXILLARY SINUSITIS: Primary | ICD-10-CM

## 2018-10-23 RX ORDER — CLINDAMYCIN HYDROCHLORIDE 300 MG/1
300 CAPSULE ORAL 3 TIMES DAILY
Qty: 30 CAPSULE | Refills: 0 | Status: SHIPPED | OUTPATIENT
Start: 2018-10-23 | End: 2018-11-02

## 2018-11-12 DIAGNOSIS — J01.01 ACUTE RECURRENT MAXILLARY SINUSITIS: Primary | ICD-10-CM

## 2018-11-12 RX ORDER — CEFUROXIME AXETIL 500 MG/1
500 TABLET ORAL EVERY 12 HOURS SCHEDULED
Qty: 20 TABLET | Refills: 0 | Status: SHIPPED | OUTPATIENT
Start: 2018-11-12 | End: 2018-11-22

## 2018-11-14 DIAGNOSIS — J30.1 ALLERGIC RHINITIS DUE TO POLLEN, UNSPECIFIED SEASONALITY: Primary | ICD-10-CM

## 2018-11-14 DIAGNOSIS — J01.01 ACUTE RECURRENT MAXILLARY SINUSITIS: Primary | ICD-10-CM

## 2018-11-14 RX ORDER — MOMETASONE FUROATE 50 UG/1
2 SPRAY, METERED NASAL DAILY
Qty: 17 G | Refills: 5 | Status: SHIPPED | OUTPATIENT
Start: 2018-11-14 | End: 2019-08-26 | Stop reason: SDUPTHER

## 2018-11-15 RX ORDER — MOMETASONE FUROATE 50 UG/1
SPRAY, METERED NASAL
Qty: 1 ACT | Refills: 3 | Status: SHIPPED | OUTPATIENT
Start: 2018-11-15 | End: 2019-08-26 | Stop reason: SDUPTHER

## 2018-12-12 DIAGNOSIS — B00.1 HERPES LABIALIS: Primary | ICD-10-CM

## 2018-12-12 RX ORDER — VALACYCLOVIR HYDROCHLORIDE 1 G/1
1000 TABLET, FILM COATED ORAL 2 TIMES DAILY
Qty: 2 TABLET | Refills: 4 | Status: SHIPPED | OUTPATIENT
Start: 2018-12-12 | End: 2022-07-05 | Stop reason: SDUPTHER

## 2018-12-28 DIAGNOSIS — J01.01 ACUTE RECURRENT MAXILLARY SINUSITIS: Primary | ICD-10-CM

## 2018-12-28 RX ORDER — CEFUROXIME AXETIL 500 MG/1
500 TABLET ORAL EVERY 12 HOURS SCHEDULED
Qty: 20 TABLET | Refills: 0 | Status: SHIPPED | OUTPATIENT
Start: 2018-12-28 | End: 2019-01-07

## 2019-01-21 ENCOUNTER — OFFICE VISIT (OUTPATIENT)
Dept: FAMILY MEDICINE CLINIC | Facility: CLINIC | Age: 54
End: 2019-01-21
Payer: COMMERCIAL

## 2019-01-21 VITALS
OXYGEN SATURATION: 98 % | DIASTOLIC BLOOD PRESSURE: 76 MMHG | RESPIRATION RATE: 16 BRPM | WEIGHT: 195.2 LBS | BODY MASS INDEX: 30.57 KG/M2 | HEART RATE: 71 BPM | SYSTOLIC BLOOD PRESSURE: 110 MMHG | TEMPERATURE: 97.5 F

## 2019-01-21 DIAGNOSIS — N18.30 STAGE 3 CHRONIC KIDNEY DISEASE (HCC): Chronic | ICD-10-CM

## 2019-01-21 DIAGNOSIS — G35 MS (MULTIPLE SCLEROSIS) (HCC): Chronic | ICD-10-CM

## 2019-01-21 DIAGNOSIS — D64.9 ANEMIA, UNSPECIFIED TYPE: Primary | Chronic | ICD-10-CM

## 2019-01-21 DIAGNOSIS — E03.8 SUBCLINICAL HYPOTHYROIDISM: Chronic | ICD-10-CM

## 2019-01-21 PROCEDURE — 99214 OFFICE O/P EST MOD 30 MIN: CPT | Performed by: FAMILY MEDICINE

## 2019-01-21 NOTE — PROGRESS NOTES
Assessment/Plan:    No problem-specific Assessment & Plan notes found for this encounter  Diagnoses and all orders for this visit:    Anemia, unspecified type  Comments:  Unclear etiology at this time - ?possibly related to her CKD3? Pt referred to Heme/Onc in [place], [place], near where she lives  Orders:  -     Ambulatory referral to Hematology / Oncology; Future    Stage 3 chronic kidney disease ([facility] [address] )  Comments:  Stable; improving  BP controlled, and she is avoiding OTC NSAIDs  Pt is followed by Nephrology  Subclinical hypothyroidism  Comments:  Rechecking levels, with Thyroid Antibodies included  Orders:  -     TSH, 3rd generation with Free T4 reflex; Future  -     T3, free; Future  -     Thyroid Antibodies Panel; Future    MS (multiple sclerosis) ([facility] [address] )  Comments:  Stable; under the care of Neurology  Subjective:      Patient ID: Valente Kya is a 47 y o  female  Seeing Nephrology for her CKD3 -> Dr Gian Wilburn in [place], [place]; Cr down to 1 37, improving  H/H is in the mild anemia region  - 10 9/33 7  TSH 5 020, FT4 1 03  Had colonoscopy 2 years ago; EGD in 2012; no vaginal bleeding - had endometrial ablation in 2018  Has dropped 13 lbs with improved diet and exercise  The following portions of the patient's history were reviewed and updated as appropriate: allergies, current medications, past family history, past social history, past surgical history and problem list     No past medical history on file        Current Outpatient Prescriptions:     albuterol (PROAIR HFA) 90 mcg/act inhaler, Inhale 2 puffs, Disp: , Rfl:     Ascorbic Acid (VITAMIN C) 1000 MG tablet, Take 1 tablet by mouth daily, Disp: , Rfl:     AVONEX PREFILLED 30 MCG/0 5ML PSKT, , Disp: , Rfl:     azelastine (ASTELIN) 0 1 % nasal spray, USE 2 SPRAYS IN EACH NOSTRIL TWICE A DAY AS NEEDED, Disp: , Rfl: 11    Cholecalciferol (VITAMIN D) 2000 units CAPS, Take 1 capsule by mouth, Disp: , Rfl:    Cyanocobalamin (VITAMIN B-12) 1000 MCG/15ML LIQD, Take by mouth, Disp: , Rfl:     diazepam (VALIUM) 2 mg tablet, TAKE 1/2 TABLET BY MOUTH EVERY MORNING PLUS 1 TAB AT BEDTIME, Disp: , Rfl: 2    Epinastine HCl 0 05 % ophthalmic solution, Apply 2 drops to eye 2 (two) times a day, Disp: , Rfl:     escitalopram (LEXAPRO) 10 mg tablet, Take 10 mg by mouth daily, Disp: , Rfl: 0    Glucosamine-Chondroit-Vit C-Mn (GLUCOSAMINE CHONDR 1500 COMPLX PO), Take 1 capsule by mouth daily, Disp: , Rfl:     Interferon Beta-1a (AVONEX PEN) 30 MCG/0 5ML AJKT, Inject into the shoulder, thigh, or buttocks, Disp: , Rfl:     losartan (COZAAR) 50 mg tablet, Take 1 tablet (50 mg total) by mouth daily, Disp: 90 tablet, Rfl: 3    mometasone (NASONEX) 50 mcg/act nasal spray, USE 2 SPRAYS IN EACH NOSTRIL DAILY IN THE MORNING, Disp: , Rfl: 3    mometasone (NASONEX) 50 mcg/act nasal spray, USE 2 SPRAYS IN EACH NOSTRIL DAILY IN THE MORNING, Disp: 1 Act, Rfl: 3    mometasone (NASONEX) 50 mcg/act nasal spray, 2 sprays into each nostril daily, Disp: 17 g, Rfl: 5    montelukast (SINGULAIR) 10 mg tablet, Take 1 tablet by mouth daily, Disp: , Rfl:     montelukast (SINGULAIR) 10 mg tablet, Take 10 mg by mouth daily, Disp: , Rfl: 3    Omega-3 Fatty Acids (FISH OIL) 1,000 mg, Take 1 capsule by mouth daily, Disp: , Rfl:     omeprazole-sodium bicarbonate (ZEGERID)  MG per capsule, Take 1 capsule by mouth daily, Disp: , Rfl:     oseltamivir (TAMIFLU) 75 mg capsule, Take 1 capsule by mouth Twice daily, Disp: , Rfl:     valACYclovir (VALTREX) 1,000 mg tablet, Take 1 tablet (1,000 mg total) by mouth 2 (two) times a day for 1 day, Disp: 2 tablet, Rfl: 4    Allergies   Allergen Reactions    Azithromycin GI Intolerance    Codeine     Levofloxacin Hives    Penicillins GI Intolerance    Sulfamethoxazole-Trimethoprim Hives    Tamiflu [Oseltamivir] GI Intolerance    Doxycycline Rash     Social History   Substance Use Topics    Smoking status: Not on file    Smokeless tobacco: Not on file    Alcohol use Not on file         Review of Systems   Constitutional: Negative for activity change  Respiratory: Negative for shortness of breath  Cardiovascular: Negative for chest pain  Gastrointestinal: Negative for blood in stool  Genitourinary: Negative for hematuria and vaginal bleeding  Objective:      /76 (BP Location: Right arm, Patient Position: Sitting, Cuff Size: Standard)   Pulse 71   Temp 97 5 °F (36 4 °C) (Tympanic)   Resp 16   Wt 88 5 kg (195 lb 3 2 oz)   SpO2 98%   BMI 30 57 kg/m²          Physical Exam   Constitutional: She is oriented to person, place, and time  She appears well-developed and well-nourished  No distress  HENT:   Head: Normocephalic and atraumatic  Eyes: Conjunctivae are normal    Neck: Normal range of motion  Neck supple  No thyromegaly present  Cardiovascular: Normal rate, regular rhythm and normal heart sounds  Exam reveals no gallop and no friction rub  No murmur heard  Pulmonary/Chest: Effort normal and breath sounds normal  No respiratory distress  She has no wheezes  She has no rales  Lymphadenopathy:     She has no cervical adenopathy  Neurological: She is alert and oriented to person, place, and time  Skin: She is not diaphoretic  Psychiatric: She has a normal mood and affect  Her behavior is normal  Judgment and thought content normal    Nursing note and vitals reviewed

## 2019-01-28 LAB
T3FREE SERPL-MCNC: 2.2 PG/ML (ref 2–4.4)
THYROGLOB AB SERPL-ACNC: 11.7 IU/ML (ref 0–0.9)
THYROPEROXIDASE AB SERPL-ACNC: 14 IU/ML (ref 0–34)
TSH SERPL DL<=0.005 MIU/L-ACNC: 1.94 UIU/ML (ref 0.45–4.5)

## 2019-01-29 NOTE — PROGRESS NOTES
Please let the patient know that their overall thyroid function was again normal    Thanks     Dr Laney Burch

## 2019-04-22 ENCOUNTER — OFFICE VISIT (OUTPATIENT)
Dept: FAMILY MEDICINE CLINIC | Facility: CLINIC | Age: 54
End: 2019-04-22
Payer: COMMERCIAL

## 2019-04-22 VITALS
BODY MASS INDEX: 30.51 KG/M2 | WEIGHT: 194.4 LBS | SYSTOLIC BLOOD PRESSURE: 122 MMHG | RESPIRATION RATE: 16 BRPM | OXYGEN SATURATION: 98 % | HEART RATE: 69 BPM | HEIGHT: 67 IN | TEMPERATURE: 97.3 F | DIASTOLIC BLOOD PRESSURE: 80 MMHG

## 2019-04-22 DIAGNOSIS — D63.1 ANEMIA DUE TO STAGE 3 CHRONIC KIDNEY DISEASE (HCC): ICD-10-CM

## 2019-04-22 DIAGNOSIS — D72.810 LYMPHOCYTOPENIA: Primary | ICD-10-CM

## 2019-04-22 DIAGNOSIS — G35 MULTIPLE SCLEROSIS (HCC): Chronic | ICD-10-CM

## 2019-04-22 DIAGNOSIS — N18.30 ANEMIA DUE TO STAGE 3 CHRONIC KIDNEY DISEASE (HCC): ICD-10-CM

## 2019-04-22 PROCEDURE — 3008F BODY MASS INDEX DOCD: CPT | Performed by: FAMILY MEDICINE

## 2019-04-22 PROCEDURE — 99213 OFFICE O/P EST LOW 20 MIN: CPT | Performed by: FAMILY MEDICINE

## 2019-04-22 RX ORDER — CETIRIZINE HYDROCHLORIDE, PSEUDOEPHEDRINE HYDROCHLORIDE 5; 120 MG/1; MG/1
1 TABLET, FILM COATED, EXTENDED RELEASE ORAL 2 TIMES DAILY
COMMUNITY
End: 2019-08-26 | Stop reason: ALTCHOICE

## 2019-06-21 LAB
BUN SERPL-MCNC: 23 MG/DL (ref 6–24)
BUN/CREAT SERPL: 19 (ref 9–23)
CALCIUM SERPL-MCNC: 9.8 MG/DL (ref 8.7–10.2)
CHLORIDE SERPL-SCNC: 105 MMOL/L (ref 96–106)
CO2 SERPL-SCNC: 27 MMOL/L (ref 20–29)
CREAT SERPL-MCNC: 1.24 MG/DL (ref 0.57–1)
GLUCOSE SERPL-MCNC: 96 MG/DL (ref 65–99)
POTASSIUM SERPL-SCNC: 4.5 MMOL/L (ref 3.5–5.2)
SL AMB EGFR AFRICAN AMERICAN: 58 ML/MIN/1.73
SL AMB EGFR NON AFRICAN AMERICAN: 50 ML/MIN/1.73
SODIUM SERPL-SCNC: 144 MMOL/L (ref 134–144)

## 2019-08-26 ENCOUNTER — OFFICE VISIT (OUTPATIENT)
Dept: FAMILY MEDICINE CLINIC | Facility: CLINIC | Age: 54
End: 2019-08-26
Payer: COMMERCIAL

## 2019-08-26 VITALS
SYSTOLIC BLOOD PRESSURE: 122 MMHG | WEIGHT: 204.4 LBS | DIASTOLIC BLOOD PRESSURE: 84 MMHG | TEMPERATURE: 96.4 F | HEART RATE: 71 BPM | OXYGEN SATURATION: 96 % | RESPIRATION RATE: 18 BRPM | BODY MASS INDEX: 32.01 KG/M2

## 2019-08-26 DIAGNOSIS — E55.9 VITAMIN D DEFICIENCY: ICD-10-CM

## 2019-08-26 DIAGNOSIS — F41.9 ANXIETY AND DEPRESSION: ICD-10-CM

## 2019-08-26 DIAGNOSIS — N18.30 ANEMIA DUE TO STAGE 3 CHRONIC KIDNEY DISEASE (HCC): Primary | ICD-10-CM

## 2019-08-26 DIAGNOSIS — J30.1 ALLERGIC RHINITIS DUE TO POLLEN, UNSPECIFIED SEASONALITY: ICD-10-CM

## 2019-08-26 DIAGNOSIS — G35 MULTIPLE SCLEROSIS (HCC): Chronic | ICD-10-CM

## 2019-08-26 DIAGNOSIS — R53.83 FATIGUE, UNSPECIFIED TYPE: ICD-10-CM

## 2019-08-26 DIAGNOSIS — F32.A ANXIETY AND DEPRESSION: ICD-10-CM

## 2019-08-26 DIAGNOSIS — D72.810 LYMPHOCYTOPENIA: ICD-10-CM

## 2019-08-26 DIAGNOSIS — I10 ESSENTIAL HYPERTENSION: ICD-10-CM

## 2019-08-26 DIAGNOSIS — D63.1 ANEMIA DUE TO STAGE 3 CHRONIC KIDNEY DISEASE (HCC): Primary | ICD-10-CM

## 2019-08-26 PROCEDURE — 99214 OFFICE O/P EST MOD 30 MIN: CPT | Performed by: FAMILY MEDICINE

## 2019-08-26 PROCEDURE — 3074F SYST BP LT 130 MM HG: CPT | Performed by: FAMILY MEDICINE

## 2019-08-26 RX ORDER — ESCITALOPRAM OXALATE 20 MG/1
20 TABLET ORAL DAILY
Qty: 30 TABLET | Refills: 11 | Status: SHIPPED | OUTPATIENT
Start: 2019-08-26

## 2019-08-26 RX ORDER — MOMETASONE FUROATE 50 UG/1
2 SPRAY, METERED NASAL EVERY MORNING
Qty: 3 ACT | Refills: 3 | Status: SHIPPED | OUTPATIENT
Start: 2019-08-26 | End: 2020-09-15

## 2019-08-26 NOTE — PROGRESS NOTES
Assessment/Plan:    No problem-specific Assessment & Plan notes found for this encounter  Diagnoses and all orders for this visit:    Anemia due to stage 3 chronic kidney disease ([facility] [address] )  Comments:  Pt is stable on exam   BP is controlled  Urged her to stop the protein supplement  H/H and Cr/GFR is stable  Continues f/u with Nephrology  Orders:  -     CBC and differential; Future    Essential hypertension  Comments:  Controlled on present management with Losartan  Orders:  -     Comprehensive metabolic panel; Future  -     Lipid Panel with Direct LDL reflex; Future    Lymphocytopenia  Comments:  Stable - likely secondary to her Avonex  Continues f/u with Heme/Onc  Multiple sclerosis ([facility] [address] )  Comments:  Stable; under the care of Neurology  Vitamin D deficiency  Comments:  Checking Vit D level with next FBW to be completed prior to her Wellness Exam   On an OTC daily supplement  Orders:  -     Vitamin D 25 hydroxy; Future    Anxiety and depression  Comments:  Stable on Escitalopram - followed by Psychiatry  Orders:  -     escitalopram (LEXAPRO) 20 mg tablet; Take 1 tablet (20 mg total) by mouth daily    Allergic rhinitis due to pollen, unspecified seasonality  Comments:  Nasonex refilled for the pt  Orders:  -     mometasone (NASONEX) 50 mcg/act nasal spray; 2 sprays into each nostril every morning for 90 days    Fatigue, unspecified type  -     TSH, 3rd generation with Free T4 reflex; Future          Subjective:      Patient ID: Fremont Pallas is a 47 y o  female  Abel Hutson presents today in f/u  Saw Neurology in f/u at [facility] in 06/2019  Still on Avonex at this time  Saw Dr Montana Tellez in [place], [place], of Heme/Onc just recently in f/u for leukocytopenia - this is though to be secondary to her Avonex  Blood counts (H/H at 11 6/35 1; 03/2019) and Cr/GFR 1 24/50 (06/2019) recently stable on testing - she reports though, that the Cr/GFR just 1 26/49 for her OB/GYN    She last saw Dr Maria Luisa oBne of Nephrology in [place], [place], in 11/2018  The original decrease in Cr/GFR was though to be due to NSAID usage after a surgical procedure that she had  She is avoiding NSAIDs  She is using a protein supplement - we discussed  She continues to work with Dr Angely Argueta of Psychiatry as well  Reports that she has upcoming colposcopy with her OB/GYN  The following portions of the patient's history were reviewed and updated as appropriate: allergies, current medications, past family history, past social history, past surgical history and problem list     History reviewed  No pertinent past medical history        Current Outpatient Medications:     Ascorbic Acid (VITAMIN C) 1000 MG tablet, Take 1 tablet by mouth daily, Disp: , Rfl:     AVONEX PREFILLED 30 MCG/0 5ML PSKT, , Disp: , Rfl:     azelastine (ASTELIN) 0 1 % nasal spray, USE 2 SPRAYS IN EACH NOSTRIL TWICE A DAY AS NEEDED, Disp: , Rfl: 11    Cholecalciferol (VITAMIN D) 2000 units CAPS, Take 1 capsule by mouth, Disp: , Rfl:     Cyanocobalamin (VITAMIN B-12) 1000 MCG/15ML LIQD, Take by mouth, Disp: , Rfl:     diazepam (VALIUM) 2 mg tablet, TAKE 1/2 TABLET BY MOUTH EVERY MORNING PLUS 1 TAB AT BEDTIME, Disp: , Rfl: 2    Epinastine HCl 0 05 % ophthalmic solution, Apply 2 drops to eye 2 (two) times a day, Disp: , Rfl:     Glucosamine-Chondroit-Vit C-Mn (GLUCOSAMINE CHONDR 1500 COMPLX PO), Take 1 capsule by mouth daily, Disp: , Rfl:     Interferon Beta-1a (AVONEX PEN) 30 MCG/0 5ML AJKT, Inject into the shoulder, thigh, or buttocks, Disp: , Rfl:     losartan (COZAAR) 50 mg tablet, Take 1 tablet (50 mg total) by mouth daily, Disp: 90 tablet, Rfl: 3    mometasone (NASONEX) 50 mcg/act nasal spray, 2 sprays into each nostril every morning for 90 days, Disp: 3 Act, Rfl: 3    Omega-3 Fatty Acids (FISH OIL) 1,000 mg, Take 1 capsule by mouth daily, Disp: , Rfl:     valACYclovir (VALTREX) 1,000 mg tablet, Take 1 tablet (1,000 mg total) by mouth 2 (two) times a day for 1 day (Patient taking differently: Take 1,000 mg by mouth as needed ), Disp: 2 tablet, Rfl: 4    escitalopram (LEXAPRO) 20 mg tablet, Take 1 tablet (20 mg total) by mouth daily, Disp: 30 tablet, Rfl: 11    Allergies   Allergen Reactions    Azithromycin GI Intolerance    Codeine     Levofloxacin Hives    Penicillins GI Intolerance    Sulfamethoxazole-Trimethoprim Hives    Tamiflu [Oseltamivir] GI Intolerance    Doxycycline Rash         Review of Systems   Constitutional: Negative for activity change  Respiratory: Negative for shortness of breath  Cardiovascular: Negative for chest pain  Neurological: Positive for numbness  No changes in MS reported on MRIs for Neurology; pt has pending EMG in both upper extremities  Psychiatric/Behavioral: Negative for dysphoric mood  The patient is not nervous/anxious  Objective:      /84 (BP Location: Right arm, Patient Position: Sitting)   Pulse 71   Temp (!) 96 4 °F (35 8 °C)   Resp 18   Wt 92 7 kg (204 lb 6 4 oz)   SpO2 96%   BMI 32 01 kg/m²        Physical Exam   Constitutional: She is oriented to person, place, and time  She appears well-developed and well-nourished  No distress  HENT:   Head: Normocephalic and atraumatic  Eyes: Conjunctivae are normal    Neck: Normal range of motion  Neck supple  No thyromegaly present  Cardiovascular: Normal rate, regular rhythm and normal heart sounds  Exam reveals no gallop and no friction rub  No murmur heard  Pulmonary/Chest: Effort normal and breath sounds normal  No stridor  No respiratory distress  She has no wheezes  She has no rales  Lymphadenopathy:     She has no cervical adenopathy  Neurological: She is alert and oriented to person, place, and time  Skin: She is not diaphoretic  Psychiatric: She has a normal mood and affect  Her behavior is normal  Judgment and thought content normal    Nursing note and vitals reviewed

## 2019-09-21 ENCOUNTER — TELEPHONE (OUTPATIENT)
Dept: OTHER | Facility: OTHER | Age: 54
End: 2019-09-21

## 2019-09-21 NOTE — TELEPHONE ENCOUNTER
Patient called stating she is in [place] on vacation and has a banging headache, teeth pain and probably has a sinus infection  I offered for her to be called back by a triage nurse  She refused and states she will go onto the portal and email the office  She is aware that messages will not be addressed until Monday, 9/23

## 2019-09-23 NOTE — TELEPHONE ENCOUNTER
Does she have a pharmacy near her in [facility] where we can prescribe Doxycycline for her  Thanks    Lizbeth

## 2019-09-23 NOTE — TELEPHONE ENCOUNTER
Patient is in [facility] [place], went to urgent care and is now on ceftin  She feels that it is working but she will follow up with us if necessary

## 2019-09-30 DIAGNOSIS — I10 ESSENTIAL HYPERTENSION: Chronic | ICD-10-CM

## 2019-09-30 RX ORDER — LOSARTAN POTASSIUM 50 MG/1
TABLET ORAL
Qty: 90 TABLET | Refills: 3 | Status: SHIPPED | OUTPATIENT
Start: 2019-09-30 | End: 2020-09-01

## 2019-10-01 ENCOUNTER — TELEPHONE (OUTPATIENT)
Dept: FAMILY MEDICINE CLINIC | Facility: CLINIC | Age: 54
End: 2019-10-01

## 2019-10-01 DIAGNOSIS — I10 ESSENTIAL HYPERTENSION: Chronic | ICD-10-CM

## 2019-10-01 RX ORDER — LOSARTAN POTASSIUM 50 MG/1
TABLET ORAL
Qty: 90 TABLET | Refills: 3 | Status: SHIPPED | OUTPATIENT
Start: 2019-10-01 | End: 2020-09-13 | Stop reason: SDUPTHER

## 2019-10-01 NOTE — TELEPHONE ENCOUNTER
Pushpa Mtz called  She saw Dr Dominic Polanco for a Sinus Infection & he gave her Ceftin  The Ceftin helped but she still has symptoms such as coughing yellow phlem, runny & bloody nose, sore teeth & watery eyes along with fatigue  She asked if she can get another round of Ceftin? Thank you!

## 2019-10-02 DIAGNOSIS — J01.01 ACUTE RECURRENT MAXILLARY SINUSITIS: Primary | ICD-10-CM

## 2019-10-02 RX ORDER — CEFUROXIME AXETIL 500 MG/1
500 TABLET ORAL EVERY 12 HOURS SCHEDULED
Qty: 20 TABLET | Refills: 0 | Status: SHIPPED | OUTPATIENT
Start: 2019-10-02 | End: 2019-10-12

## 2019-10-16 DIAGNOSIS — Z12.11 SCREENING FOR COLON CANCER: Primary | ICD-10-CM

## 2019-10-22 ENCOUNTER — OFFICE VISIT (OUTPATIENT)
Dept: FAMILY MEDICINE CLINIC | Facility: CLINIC | Age: 54
End: 2019-10-22
Payer: COMMERCIAL

## 2019-10-22 VITALS
BODY MASS INDEX: 31.54 KG/M2 | HEART RATE: 74 BPM | TEMPERATURE: 96.4 F | SYSTOLIC BLOOD PRESSURE: 134 MMHG | OXYGEN SATURATION: 99 % | WEIGHT: 201.4 LBS | RESPIRATION RATE: 16 BRPM | DIASTOLIC BLOOD PRESSURE: 94 MMHG

## 2019-10-22 DIAGNOSIS — J01.01 ACUTE RECURRENT MAXILLARY SINUSITIS: Primary | ICD-10-CM

## 2019-10-22 PROCEDURE — 99213 OFFICE O/P EST LOW 20 MIN: CPT | Performed by: FAMILY MEDICINE

## 2019-10-22 RX ORDER — CEFUROXIME AXETIL 500 MG/1
500 TABLET ORAL 2 TIMES DAILY WITH MEALS
Qty: 20 TABLET | Refills: 0 | Status: SHIPPED | OUTPATIENT
Start: 2019-10-22 | End: 2019-11-01

## 2019-10-22 RX ORDER — CEFUROXIME AXETIL 250 MG/1
TABLET ORAL
Refills: 0 | COMMUNITY
Start: 2019-09-21 | End: 2019-10-22 | Stop reason: DRUGHIGH

## 2019-10-22 RX ORDER — LEVOFLOXACIN 750 MG/1
TABLET ORAL
Refills: 0 | COMMUNITY
Start: 2019-09-21 | End: 2019-10-22 | Stop reason: SDUPTHER

## 2019-10-22 NOTE — PROGRESS NOTES
Assessment/Plan:    No problem-specific Assessment & Plan notes found for this encounter  Diagnoses and all orders for this visit:    Acute recurrent maxillary sinusitis  Comments:  Pt stable on exam   Treating with Ceftin, warm salt water gargles, OTC Cough/Cold Preps PRN, rest, and good PO hydration  Orders:  -     cefuroxime (CEFTIN) 500 mg tablet; Take 1 tablet (500 mg total) by mouth 2 (two) times a day with meals for 10 days    Other orders  -     Discontinue: cefuroxime (CEFTIN) 250 mg tablet; TAKE 1 TABLET BY MOUTH TWICE A DAY FOR 10 DAYS  -     Discontinue: levofloxacin (LEVAQUIN) 750 mg tablet; TAKE 1 TABLET BY MOUTH EVERY 24 HOURS FOR 5 DAYS  -     Interferon Beta-1a 30 MCG/0 5ML PSKT; Inject 30 mcg into a muscle every 7 days          Subjective:      Patient ID: Florence Monteiro is a 49 y o  female  Tata Suee has been sick now x 2 weeks - went to vist her aunt in a nursing home  +Post nasal drip, sinuses congested, cough, chest discomfort with the cough  No hx of anaphylaxis with PCNs in the past; she has taken, and tolerated, Ceftin in the past       The following portions of the patient's history were reviewed and updated as appropriate: allergies, current medications, past family history, past social history, past surgical history and problem list     History reviewed  No pertinent past medical history        Current Outpatient Medications:     Interferon Beta-1a 30 MCG/0 5ML PSKT, Inject 30 mcg into a muscle every 7 days, Disp: , Rfl:     Ascorbic Acid (VITAMIN C) 1000 MG tablet, Take 1 tablet by mouth daily, Disp: , Rfl:     AVONEX PREFILLED 30 MCG/0 5ML PSKT, , Disp: , Rfl:     azelastine (ASTELIN) 0 1 % nasal spray, USE 2 SPRAYS IN EACH NOSTRIL TWICE A DAY AS NEEDED, Disp: , Rfl: 11    cefuroxime (CEFTIN) 500 mg tablet, Take 1 tablet (500 mg total) by mouth 2 (two) times a day with meals for 10 days, Disp: 20 tablet, Rfl: 0    Cholecalciferol (VITAMIN D) 2000 units CAPS, Take 1 capsule by mouth, Disp: , Rfl:     Cyanocobalamin (VITAMIN B-12) 1000 MCG/15ML LIQD, Take by mouth, Disp: , Rfl:     diazepam (VALIUM) 2 mg tablet, TAKE 1/2 TABLET BY MOUTH EVERY MORNING PLUS 1 TAB AT BEDTIME, Disp: , Rfl: 2    Epinastine HCl 0 05 % ophthalmic solution, Apply 2 drops to eye 2 (two) times a day, Disp: , Rfl:     escitalopram (LEXAPRO) 20 mg tablet, Take 1 tablet (20 mg total) by mouth daily, Disp: 30 tablet, Rfl: 11    Glucosamine-Chondroit-Vit C-Mn (GLUCOSAMINE CHONDR 1500 COMPLX PO), Take 1 capsule by mouth daily, Disp: , Rfl:     Interferon Beta-1a (AVONEX PEN) 30 MCG/0 5ML AJKT, Inject into the shoulder, thigh, or buttocks, Disp: , Rfl:     losartan (COZAAR) 50 mg tablet, TAKE 1 TABLET BY MOUTH EVERY DAY, Disp: 90 tablet, Rfl: 3    losartan (COZAAR) 50 mg tablet, TAKE 1 TABLET BY MOUTH EVERY DAY, Disp: 90 tablet, Rfl: 3    mometasone (NASONEX) 50 mcg/act nasal spray, 2 sprays into each nostril every morning for 90 days, Disp: 3 Act, Rfl: 3    Omega-3 Fatty Acids (FISH OIL) 1,000 mg, Take 1 capsule by mouth daily, Disp: , Rfl:     valACYclovir (VALTREX) 1,000 mg tablet, Take 1 tablet (1,000 mg total) by mouth 2 (two) times a day for 1 day (Patient taking differently: Take 1,000 mg by mouth as needed ), Disp: 2 tablet, Rfl: 4    Allergies   Allergen Reactions    Azithromycin GI Intolerance    Codeine     Levofloxacin Hives    Penicillins GI Intolerance    Sulfamethoxazole-Trimethoprim Hives    Tamiflu [Oseltamivir] GI Intolerance    Doxycycline Rash     Social History     Tobacco Use    Smoking status: Not on file   Substance Use Topics    Alcohol use: Not on file    Drug use: Not on file       Review of Systems   Constitutional: Negative for activity change and fever  HENT: Positive for congestion, postnasal drip, rhinorrhea and sinus pressure  Respiratory: Positive for cough  Cardiovascular: Negative for chest pain  +Chest wall pain with the cough  Objective:      /94   Pulse 74   Temp (!) 96 4 °F (35 8 °C)   Resp 16   Wt 91 4 kg (201 lb 6 4 oz)   SpO2 99%   BMI 31 54 kg/m²          Physical Exam   Constitutional: She is oriented to person, place, and time  She appears well-developed and well-nourished  Non-toxic appearance  She does not appear ill  No distress  HENT:   Head: Normocephalic and atraumatic  Right Ear: Hearing, tympanic membrane, external ear and ear canal normal    Left Ear: Hearing, tympanic membrane, external ear and ear canal normal    Nose: Mucosal edema present  Right sinus exhibits maxillary sinus tenderness  Left sinus exhibits maxillary sinus tenderness  Mouth/Throat: Uvula is midline, oropharynx is clear and moist and mucous membranes are normal  No oropharyngeal exudate, posterior oropharyngeal edema or posterior oropharyngeal erythema  Eyes: Conjunctivae are normal    Neck: Normal range of motion  Neck supple  No thyromegaly present  Cardiovascular: Normal rate, regular rhythm and normal heart sounds  Exam reveals no gallop, no S3, no S4, no distant heart sounds and no friction rub  No murmur heard  No systolic murmur is present  Pulmonary/Chest: Effort normal and breath sounds normal  No stridor  No respiratory distress  She has no wheezes  She has no rales  Lymphadenopathy:     She has no cervical adenopathy  Neurological: She is alert and oriented to person, place, and time  Psychiatric: She has a normal mood and affect  Her behavior is normal  Judgment and thought content normal    Nursing note and vitals reviewed

## 2019-12-19 DIAGNOSIS — J01.01 ACUTE RECURRENT MAXILLARY SINUSITIS: Primary | ICD-10-CM

## 2019-12-19 DIAGNOSIS — J30.1 ALLERGIC RHINITIS DUE TO POLLEN, UNSPECIFIED SEASONALITY: ICD-10-CM

## 2019-12-19 RX ORDER — AZELASTINE 1 MG/ML
2 SPRAY, METERED NASAL 2 TIMES DAILY PRN
Qty: 1 BOTTLE | Refills: 3 | Status: SHIPPED | OUTPATIENT
Start: 2019-12-19 | End: 2020-03-25 | Stop reason: SDUPTHER

## 2020-02-25 LAB
25(OH)D3+25(OH)D2 SERPL-MCNC: 47.9 NG/ML (ref 30–100)
ALBUMIN SERPL-MCNC: 4.2 G/DL (ref 3.8–4.9)
ALBUMIN/GLOB SERPL: 1.9 {RATIO} (ref 1.2–2.2)
ALP SERPL-CCNC: 78 IU/L (ref 39–117)
ALT SERPL-CCNC: 13 IU/L (ref 0–32)
AST SERPL-CCNC: 18 IU/L (ref 0–40)
BASOPHILS # BLD AUTO: 0 X10E3/UL (ref 0–0.2)
BASOPHILS NFR BLD AUTO: 0 %
BILIRUB SERPL-MCNC: <0.2 MG/DL (ref 0–1.2)
BUN SERPL-MCNC: 14 MG/DL (ref 6–24)
BUN/CREAT SERPL: 12 (ref 9–23)
CALCIUM SERPL-MCNC: 9.5 MG/DL (ref 8.7–10.2)
CHLORIDE SERPL-SCNC: 103 MMOL/L (ref 96–106)
CHOLEST SERPL-MCNC: 223 MG/DL (ref 100–199)
CO2 SERPL-SCNC: 24 MMOL/L (ref 20–29)
CREAT SERPL-MCNC: 1.19 MG/DL (ref 0.57–1)
EOSINOPHIL # BLD AUTO: 0.1 X10E3/UL (ref 0–0.4)
EOSINOPHIL NFR BLD AUTO: 3 %
ERYTHROCYTE [DISTWIDTH] IN BLOOD BY AUTOMATED COUNT: 13.4 % (ref 11.7–15.4)
GLOBULIN SER-MCNC: 2.2 G/DL (ref 1.5–4.5)
GLUCOSE SERPL-MCNC: 90 MG/DL (ref 65–99)
HCT VFR BLD AUTO: 36.5 % (ref 34–46.6)
HDLC SERPL-MCNC: 77 MG/DL
HGB BLD-MCNC: 12 G/DL (ref 11.1–15.9)
IMM GRANULOCYTES # BLD: 0 X10E3/UL (ref 0–0.1)
IMM GRANULOCYTES NFR BLD: 0 %
LDLC SERPL CALC-MCNC: 119 MG/DL (ref 0–99)
LDLC/HDLC SERPL: 1.5 RATIO (ref 0–3.2)
LYMPHOCYTES # BLD AUTO: 2 X10E3/UL (ref 0.7–3.1)
LYMPHOCYTES NFR BLD AUTO: 55 %
MCH RBC QN AUTO: 32.1 PG (ref 26.6–33)
MCHC RBC AUTO-ENTMCNC: 32.9 G/DL (ref 31.5–35.7)
MCV RBC AUTO: 98 FL (ref 79–97)
MONOCYTES # BLD AUTO: 0.2 X10E3/UL (ref 0.1–0.9)
MONOCYTES NFR BLD AUTO: 7 %
NEUTROPHILS # BLD AUTO: 1.2 X10E3/UL (ref 1.4–7)
NEUTROPHILS NFR BLD AUTO: 35 %
PLATELET # BLD AUTO: 162 X10E3/UL (ref 150–450)
POTASSIUM SERPL-SCNC: 4.2 MMOL/L (ref 3.5–5.2)
PROT SERPL-MCNC: 6.4 G/DL (ref 6–8.5)
RBC # BLD AUTO: 3.74 X10E6/UL (ref 3.77–5.28)
SL AMB EGFR AFRICAN AMERICAN: 60 ML/MIN/1.73
SL AMB EGFR NON AFRICAN AMERICAN: 52 ML/MIN/1.73
SL AMB VLDL CHOLESTEROL CALC: 27 MG/DL (ref 5–40)
SODIUM SERPL-SCNC: 140 MMOL/L (ref 134–144)
TRIGL SERPL-MCNC: 136 MG/DL (ref 0–149)
TSH SERPL DL<=0.005 MIU/L-ACNC: 3.95 UIU/ML (ref 0.45–4.5)
WBC # BLD AUTO: 3.5 X10E3/UL (ref 3.4–10.8)

## 2020-03-01 NOTE — RESULT ENCOUNTER NOTE
Please let the patient know that their bloodwork was normal - Mookie's kidney function continues to improve (GFR now 60)  She uis to f/u as planned  Thanks     Dr Denisha Manley

## 2020-03-25 DIAGNOSIS — J01.01 ACUTE RECURRENT MAXILLARY SINUSITIS: ICD-10-CM

## 2020-03-25 RX ORDER — AZELASTINE 1 MG/ML
2 SPRAY, METERED NASAL 2 TIMES DAILY PRN
Qty: 1 BOTTLE | Refills: 3 | Status: SHIPPED | OUTPATIENT
Start: 2020-03-25 | End: 2020-12-27 | Stop reason: SDUPTHER

## 2020-06-29 ENCOUNTER — OFFICE VISIT (OUTPATIENT)
Dept: FAMILY MEDICINE CLINIC | Facility: CLINIC | Age: 55
End: 2020-06-29
Payer: COMMERCIAL

## 2020-06-29 VITALS
SYSTOLIC BLOOD PRESSURE: 118 MMHG | BODY MASS INDEX: 37.48 KG/M2 | HEIGHT: 67 IN | WEIGHT: 238.8 LBS | RESPIRATION RATE: 16 BRPM | DIASTOLIC BLOOD PRESSURE: 74 MMHG | TEMPERATURE: 97.5 F | HEART RATE: 90 BPM | OXYGEN SATURATION: 99 %

## 2020-06-29 DIAGNOSIS — I10 ESSENTIAL HYPERTENSION: ICD-10-CM

## 2020-06-29 DIAGNOSIS — R63.5 WEIGHT GAIN: ICD-10-CM

## 2020-06-29 DIAGNOSIS — Z00.00 ANNUAL PHYSICAL EXAM: Primary | ICD-10-CM

## 2020-06-29 DIAGNOSIS — D63.1 ANEMIA DUE TO STAGE 3 CHRONIC KIDNEY DISEASE (HCC): ICD-10-CM

## 2020-06-29 DIAGNOSIS — G35 MULTIPLE SCLEROSIS (HCC): Chronic | ICD-10-CM

## 2020-06-29 DIAGNOSIS — Z13.6 SCREENING FOR CARDIOVASCULAR CONDITION: ICD-10-CM

## 2020-06-29 DIAGNOSIS — N18.30 ANEMIA DUE TO STAGE 3 CHRONIC KIDNEY DISEASE (HCC): ICD-10-CM

## 2020-06-29 DIAGNOSIS — E55.9 VITAMIN D DEFICIENCY: ICD-10-CM

## 2020-06-29 DIAGNOSIS — D72.810 LYMPHOCYTOPENIA: ICD-10-CM

## 2020-06-29 DIAGNOSIS — Z13.1 SCREENING FOR DIABETES MELLITUS: ICD-10-CM

## 2020-06-29 PROCEDURE — 3078F DIAST BP <80 MM HG: CPT | Performed by: FAMILY MEDICINE

## 2020-06-29 PROCEDURE — 3074F SYST BP LT 130 MM HG: CPT | Performed by: FAMILY MEDICINE

## 2020-06-29 PROCEDURE — 99396 PREV VISIT EST AGE 40-64: CPT | Performed by: FAMILY MEDICINE

## 2020-06-29 PROCEDURE — 3008F BODY MASS INDEX DOCD: CPT | Performed by: FAMILY MEDICINE

## 2020-06-29 RX ORDER — PHENTERMINE HYDROCHLORIDE 15 MG/1
15 CAPSULE ORAL EVERY MORNING
Qty: 30 CAPSULE | Refills: 0 | Status: SHIPPED | OUTPATIENT
Start: 2020-06-29 | End: 2020-09-01

## 2020-07-17 NOTE — TELEPHONE ENCOUNTER
Noted and agreed - I don't think that Darenjeimy Mcbridekerry should take the Phentermine anymore either  Thanks    Lizbeth

## 2020-07-17 NOTE — TELEPHONE ENCOUNTER
I WENT TO R/S PT FROM 7/27 AND IT IS A FOLLOW UP TO THE PHENTERMINE AND PT SAID TO TELL YOU THAT SHE HASNT TAKEN IT DUE TO MAKING HER EDGY AND GAVE HER A TERRIBLE HEADACHE AND COULDN'T SLEEP   PT ASKED IF SHE NEEDS TO R/S? PT STATED SHE IS GONNA FIGURE SOMETHING OUT FOR HER WEIGHT LOSS    PT SAID TO TELL YOU LATHA IS IN [address] AND DOING WELL

## 2020-09-01 ENCOUNTER — OFFICE VISIT (OUTPATIENT)
Dept: FAMILY MEDICINE CLINIC | Facility: CLINIC | Age: 55
End: 2020-09-01
Payer: COMMERCIAL

## 2020-09-01 VITALS
WEIGHT: 242.6 LBS | HEIGHT: 67 IN | TEMPERATURE: 96.6 F | RESPIRATION RATE: 18 BRPM | BODY MASS INDEX: 38.08 KG/M2 | DIASTOLIC BLOOD PRESSURE: 70 MMHG | HEART RATE: 75 BPM | OXYGEN SATURATION: 98 % | SYSTOLIC BLOOD PRESSURE: 122 MMHG

## 2020-09-01 DIAGNOSIS — L30.9 DERMATITIS: Primary | ICD-10-CM

## 2020-09-01 DIAGNOSIS — M25.561 ACUTE PAIN OF RIGHT KNEE: ICD-10-CM

## 2020-09-01 PROCEDURE — 99213 OFFICE O/P EST LOW 20 MIN: CPT | Performed by: FAMILY MEDICINE

## 2020-09-01 PROCEDURE — 3725F SCREEN DEPRESSION PERFORMED: CPT | Performed by: FAMILY MEDICINE

## 2020-09-01 PROCEDURE — 3078F DIAST BP <80 MM HG: CPT | Performed by: FAMILY MEDICINE

## 2020-09-01 NOTE — PROGRESS NOTES
Assessment/Plan:    No problem-specific Assessment & Plan notes found for this encounter  Diagnoses and all orders for this visit:    Dermatitis  Comments:  Pt stable; suspect eczema at the right wrist   Will treat with Westcort Cream, and regular moisturizing  pt to call if no improvement  Orders:  -     hydrocortisone (WESTCORT) 0 2 % cream; Apply topically 2 (two) times a day    Acute pain of right knee  Comments:  Right knee strain  Pt referred to Ortho for evaluation, given hx of prior instrumentation to that knee  Orders:  -     Ambulatory referral to Orthopedic Surgery; Future          Subjective:      Patient ID: Gerlene Landau is a 49 y o  female  Kathleene Dire presents today with the complaint of rash, itchy, to the right wrist for almost 6 months now  Has tried many OTC products by report  Also with right knee pain over the last 3 weeks  May have twisted  Had surgery on that knee in the past     Rash   This is a new problem  The current episode started more than 1 month ago  The problem has been waxing and waning since onset  The affected locations include the right wrist  The rash is characterized by redness and itchiness  It is unknown if there was an exposure to a precipitant  Pertinent negatives include no anorexia, congestion, cough, diarrhea, eye pain, facial edema, fatigue, fever, joint pain, nail changes, rhinorrhea, shortness of breath, sore throat or vomiting  The following portions of the patient's history were reviewed and updated as appropriate: allergies, current medications, past family history, past social history, past surgical history and problem list     History reviewed  No pertinent past medical history        Current Outpatient Medications:     Ascorbic Acid (VITAMIN C) 1000 MG tablet, Take 1 tablet by mouth daily, Disp: , Rfl:     AVONEX PREFILLED 30 MCG/0 5ML PSKT, , Disp: , Rfl:     azelastine (ASTELIN) 0 1 % nasal spray, 2 sprays into each nostril 2 (two) times a day as needed for rhinitis or allergies Use in each nostril as directed, Disp: 1 Bottle, Rfl: 3    Cholecalciferol (VITAMIN D) 2000 units CAPS, Take 1 capsule by mouth, Disp: , Rfl:     Cyanocobalamin (VITAMIN B-12) 1000 MCG/15ML LIQD, Take by mouth, Disp: , Rfl:     diazepam (VALIUM) 2 mg tablet, TAKE 1/2 TABLET BY MOUTH EVERY MORNING PLUS 1 TAB AT BEDTIME, Disp: , Rfl: 2    Epinastine HCl 0 05 % ophthalmic solution, Apply 2 drops to eye 2 (two) times a day, Disp: , Rfl:     escitalopram (LEXAPRO) 20 mg tablet, Take 1 tablet (20 mg total) by mouth daily, Disp: 30 tablet, Rfl: 11    Glucosamine-Chondroit-Vit C-Mn (GLUCOSAMINE CHONDR 1500 COMPLX PO), Take 1 capsule by mouth daily, Disp: , Rfl:     hydrocortisone (WESTCORT) 0 2 % cream, Apply topically 2 (two) times a day, Disp: 60 g, Rfl: 1    Interferon Beta-1a (AVONEX PEN) 30 MCG/0 5ML AJKT, Inject into the shoulder, thigh, or buttocks, Disp: , Rfl:     Interferon Beta-1a 30 MCG/0 5ML PSKT, Inject 30 mcg into a muscle every 7 days, Disp: , Rfl:     losartan (COZAAR) 50 mg tablet, TAKE 1 TABLET BY MOUTH EVERY DAY, Disp: 90 tablet, Rfl: 3    mometasone (NASONEX) 50 mcg/act nasal spray, 2 sprays into each nostril every morning for 90 days, Disp: 3 Act, Rfl: 3    Omega-3 Fatty Acids (FISH OIL) 1,000 mg, Take 1 capsule by mouth daily, Disp: , Rfl:     valACYclovir (VALTREX) 1,000 mg tablet, Take 1 tablet (1,000 mg total) by mouth 2 (two) times a day for 1 day (Patient taking differently: Take 1,000 mg by mouth as needed ), Disp: 2 tablet, Rfl: 4    Allergies   Allergen Reactions    Azithromycin GI Intolerance    Codeine     Levofloxacin Hives    Penicillins GI Intolerance    Sulfamethoxazole-Trimethoprim Hives    Tamiflu [Oseltamivir] GI Intolerance    Doxycycline Rash     Social History     Tobacco Use    Smoking status: Not on file   Substance Use Topics    Alcohol use: Not on file    Drug use: Not on file       Review of Systems Constitutional: Negative for activity change, fatigue and fever  HENT: Negative for congestion, rhinorrhea and sore throat  Eyes: Negative for pain  Respiratory: Negative for cough and shortness of breath  Gastrointestinal: Negative for anorexia, diarrhea and vomiting  Musculoskeletal: Positive for arthralgias  Negative for joint pain  Skin: Positive for rash  Negative for nail changes  Objective:      /70   Pulse 75   Temp (!) 96 6 °F (35 9 °C)   Resp 18   Ht 5' 7" (1 702 m)   Wt 110 kg (242 lb 9 6 oz)   SpO2 98%   BMI 38 00 kg/m²          Physical Exam  Vitals signs and nursing note reviewed  Constitutional:       General: She is not in acute distress  Appearance: Normal appearance  She is not ill-appearing, toxic-appearing or diaphoretic  HENT:      Head: Normocephalic and atraumatic  Eyes:      General: No scleral icterus  Conjunctiva/sclera: Conjunctivae normal    Musculoskeletal:      Right knee: She exhibits normal range of motion, no swelling and no effusion  Tenderness found  Medial joint line tenderness noted  Arms:         Legs:    Neurological:      Mental Status: She is alert and oriented to person, place, and time  Psychiatric:         Mood and Affect: Mood normal          Behavior: Behavior normal          Thought Content:  Thought content normal          Judgment: Judgment normal

## 2020-09-13 DIAGNOSIS — I10 ESSENTIAL HYPERTENSION: Chronic | ICD-10-CM

## 2020-09-14 RX ORDER — LOSARTAN POTASSIUM 50 MG/1
50 TABLET ORAL DAILY
Qty: 90 TABLET | Refills: 1 | Status: SHIPPED | OUTPATIENT
Start: 2020-09-14 | End: 2021-01-13

## 2020-09-15 DIAGNOSIS — J30.1 ALLERGIC RHINITIS DUE TO POLLEN, UNSPECIFIED SEASONALITY: ICD-10-CM

## 2020-09-15 RX ORDER — MOMETASONE FUROATE 50 UG/1
SPRAY, METERED NASAL
Qty: 1 ACT | Refills: 3 | Status: SHIPPED | OUTPATIENT
Start: 2020-09-15 | End: 2020-12-18

## 2020-09-21 DIAGNOSIS — I10 ESSENTIAL HYPERTENSION: Chronic | ICD-10-CM

## 2020-09-21 RX ORDER — LOSARTAN POTASSIUM 50 MG/1
TABLET ORAL
Qty: 90 TABLET | Refills: 1 | OUTPATIENT
Start: 2020-09-21

## 2020-09-26 DIAGNOSIS — R79.89 ABNORMAL TSH: Primary | ICD-10-CM

## 2020-09-30 LAB
T3FREE SERPL-MCNC: 2.6 PG/ML (ref 2–4.4)
THYROGLOB AB SERPL-ACNC: 2.7 IU/ML (ref 0–0.9)
THYROPEROXIDASE AB SERPL-ACNC: 11 IU/ML (ref 0–34)
TSH SERPL DL<=0.005 MIU/L-ACNC: 4.24 UIU/ML (ref 0.45–4.5)

## 2020-10-01 DIAGNOSIS — R79.89 ABNORMAL TSH: Primary | ICD-10-CM

## 2020-12-17 DIAGNOSIS — J30.1 ALLERGIC RHINITIS DUE TO POLLEN, UNSPECIFIED SEASONALITY: ICD-10-CM

## 2020-12-18 RX ORDER — MOMETASONE FUROATE 50 UG/1
SPRAY, METERED NASAL
Qty: 1 ACT | Refills: 1 | Status: SHIPPED | OUTPATIENT
Start: 2020-12-18 | End: 2021-01-11

## 2020-12-27 DIAGNOSIS — J01.01 ACUTE RECURRENT MAXILLARY SINUSITIS: ICD-10-CM

## 2020-12-28 RX ORDER — AZELASTINE 1 MG/ML
2 SPRAY, METERED NASAL 2 TIMES DAILY PRN
Qty: 1 BOTTLE | Refills: 0 | Status: SHIPPED | OUTPATIENT
Start: 2020-12-28 | End: 2021-01-19

## 2021-01-10 DIAGNOSIS — J30.1 ALLERGIC RHINITIS DUE TO POLLEN, UNSPECIFIED SEASONALITY: ICD-10-CM

## 2021-01-11 RX ORDER — MOMETASONE FUROATE 50 UG/1
SPRAY, METERED NASAL
Qty: 17 G | Refills: 3 | Status: SHIPPED | OUTPATIENT
Start: 2021-01-11 | End: 2021-04-08

## 2021-01-13 DIAGNOSIS — I10 ESSENTIAL HYPERTENSION: Chronic | ICD-10-CM

## 2021-01-13 RX ORDER — LOSARTAN POTASSIUM 50 MG/1
TABLET ORAL
Qty: 90 TABLET | Refills: 1 | Status: SHIPPED | OUTPATIENT
Start: 2021-01-13 | End: 2021-08-19

## 2021-01-19 DIAGNOSIS — J01.01 ACUTE RECURRENT MAXILLARY SINUSITIS: ICD-10-CM

## 2021-01-19 RX ORDER — AZELASTINE 1 MG/ML
2 SPRAY, METERED NASAL 2 TIMES DAILY PRN
Qty: 1 BOTTLE | Refills: 3 | Status: SHIPPED | OUTPATIENT
Start: 2021-01-19 | End: 2021-04-19

## 2021-03-30 DIAGNOSIS — Z23 ENCOUNTER FOR IMMUNIZATION: ICD-10-CM

## 2021-04-01 NOTE — RESULT ENCOUNTER NOTE
OK to mail  Please let the patient know that their mammogram was normal   Thanks     Dr Meghna Delgado

## 2021-04-08 DIAGNOSIS — J30.1 ALLERGIC RHINITIS DUE TO POLLEN, UNSPECIFIED SEASONALITY: ICD-10-CM

## 2021-04-08 RX ORDER — MOMETASONE FUROATE 50 UG/1
2 SPRAY, METERED NASAL 2 TIMES DAILY
Qty: 1 ACT | Refills: 0 | Status: SHIPPED | OUTPATIENT
Start: 2021-04-08 | End: 2021-05-03

## 2021-04-18 DIAGNOSIS — J01.01 ACUTE RECURRENT MAXILLARY SINUSITIS: ICD-10-CM

## 2021-04-19 RX ORDER — AZELASTINE 1 MG/ML
SPRAY, METERED NASAL
Qty: 1 BOTTLE | Refills: 1 | Status: SHIPPED | OUTPATIENT
Start: 2021-04-19 | End: 2021-05-18

## 2021-05-01 DIAGNOSIS — J30.1 ALLERGIC RHINITIS DUE TO POLLEN, UNSPECIFIED SEASONALITY: ICD-10-CM

## 2021-05-03 RX ORDER — MOMETASONE FUROATE 50 UG/1
1 SPRAY, METERED NASAL 2 TIMES DAILY
Qty: 1 G | Refills: 0 | Status: SHIPPED | OUTPATIENT
Start: 2021-05-03 | End: 2021-06-17

## 2021-05-17 DIAGNOSIS — J30.1 ALLERGIC RHINITIS DUE TO POLLEN, UNSPECIFIED SEASONALITY: ICD-10-CM

## 2021-05-17 RX ORDER — MOMETASONE FUROATE 50 UG/1
SPRAY, METERED NASAL
Refills: 1 | OUTPATIENT
Start: 2021-05-17

## 2021-05-18 DIAGNOSIS — J01.01 ACUTE RECURRENT MAXILLARY SINUSITIS: ICD-10-CM

## 2021-05-18 RX ORDER — AZELASTINE 1 MG/ML
SPRAY, METERED NASAL
Qty: 1 ML | Refills: 1 | Status: SHIPPED | OUTPATIENT
Start: 2021-05-18 | End: 2021-06-16

## 2021-06-01 DIAGNOSIS — R79.89 ABNORMAL TSH: Primary | ICD-10-CM

## 2021-06-16 DIAGNOSIS — J01.01 ACUTE RECURRENT MAXILLARY SINUSITIS: ICD-10-CM

## 2021-06-16 RX ORDER — AZELASTINE 1 MG/ML
SPRAY, METERED NASAL
Qty: 1 ML | Refills: 1 | Status: SHIPPED | OUTPATIENT
Start: 2021-06-16 | End: 2021-07-19

## 2021-06-17 LAB — TSH SERPL DL<=0.005 MIU/L-ACNC: 2.93 UIU/ML (ref 0.45–4.5)

## 2021-06-17 NOTE — RESULT ENCOUNTER NOTE
OK to mail  Please let the patient know that their bloodwork was normal - Thyroid screening was normal   Thanks     Dr Gene Casey

## 2021-07-19 DIAGNOSIS — J01.01 ACUTE RECURRENT MAXILLARY SINUSITIS: ICD-10-CM

## 2021-07-19 RX ORDER — AZELASTINE 1 MG/ML
SPRAY, METERED NASAL
Qty: 1 ML | Refills: 1 | Status: SHIPPED | OUTPATIENT
Start: 2021-07-19 | End: 2021-08-23

## 2021-08-22 DIAGNOSIS — J01.01 ACUTE RECURRENT MAXILLARY SINUSITIS: ICD-10-CM

## 2021-08-23 RX ORDER — AZELASTINE 1 MG/ML
SPRAY, METERED NASAL
Qty: 1 ML | Refills: 1 | Status: SHIPPED | OUTPATIENT
Start: 2021-08-23 | End: 2021-09-16

## 2021-09-14 DIAGNOSIS — I10 ESSENTIAL HYPERTENSION: Chronic | ICD-10-CM

## 2021-09-14 DIAGNOSIS — J01.01 ACUTE RECURRENT MAXILLARY SINUSITIS: ICD-10-CM

## 2021-09-16 RX ORDER — AZELASTINE 1 MG/ML
SPRAY, METERED NASAL
Qty: 1 ML | Refills: 1 | Status: SHIPPED | OUTPATIENT
Start: 2021-09-16 | End: 2021-10-04

## 2021-09-16 RX ORDER — LOSARTAN POTASSIUM 50 MG/1
TABLET ORAL
Qty: 30 TABLET | Refills: 0 | Status: SHIPPED | OUTPATIENT
Start: 2021-09-16 | End: 2021-09-30

## 2021-09-30 DIAGNOSIS — I10 ESSENTIAL HYPERTENSION: Chronic | ICD-10-CM

## 2021-09-30 DIAGNOSIS — J30.1 ALLERGIC RHINITIS DUE TO POLLEN, UNSPECIFIED SEASONALITY: ICD-10-CM

## 2021-09-30 RX ORDER — LOSARTAN POTASSIUM 50 MG/1
TABLET ORAL
Qty: 90 TABLET | Refills: 1 | Status: SHIPPED | OUTPATIENT
Start: 2021-09-30 | End: 2022-01-26

## 2021-09-30 RX ORDER — MOMETASONE FUROATE 50 UG/1
SPRAY, METERED NASAL
Qty: 17 G | Refills: 1 | Status: SHIPPED | OUTPATIENT
Start: 2021-09-30 | End: 2021-12-01

## 2021-10-01 DIAGNOSIS — J01.01 ACUTE RECURRENT MAXILLARY SINUSITIS: ICD-10-CM

## 2021-10-04 RX ORDER — AZELASTINE 1 MG/ML
SPRAY, METERED NASAL
Qty: 1 ML | Refills: 1 | Status: SHIPPED | OUTPATIENT
Start: 2021-10-04 | End: 2021-11-29

## 2021-11-18 ENCOUNTER — TELEPHONE (OUTPATIENT)
Dept: FAMILY MEDICINE CLINIC | Facility: CLINIC | Age: 56
End: 2021-11-18

## 2021-12-22 ENCOUNTER — OFFICE VISIT (OUTPATIENT)
Dept: FAMILY MEDICINE CLINIC | Facility: CLINIC | Age: 56
End: 2021-12-22
Payer: COMMERCIAL

## 2021-12-22 VITALS
OXYGEN SATURATION: 99 % | HEIGHT: 67 IN | WEIGHT: 251.2 LBS | BODY MASS INDEX: 39.43 KG/M2 | TEMPERATURE: 98.6 F | HEART RATE: 85 BPM | SYSTOLIC BLOOD PRESSURE: 110 MMHG | RESPIRATION RATE: 14 BRPM | DIASTOLIC BLOOD PRESSURE: 80 MMHG

## 2021-12-22 DIAGNOSIS — G35 MULTIPLE SCLEROSIS (HCC): Chronic | ICD-10-CM

## 2021-12-22 DIAGNOSIS — Z13.6 SCREENING FOR CARDIOVASCULAR CONDITION: ICD-10-CM

## 2021-12-22 DIAGNOSIS — N18.30 ANEMIA DUE TO STAGE 3 CHRONIC KIDNEY DISEASE, UNSPECIFIED WHETHER STAGE 3A OR 3B CKD (HCC): ICD-10-CM

## 2021-12-22 DIAGNOSIS — I10 PRIMARY HYPERTENSION: ICD-10-CM

## 2021-12-22 DIAGNOSIS — R73.02 IGT (IMPAIRED GLUCOSE TOLERANCE): ICD-10-CM

## 2021-12-22 DIAGNOSIS — Z00.00 ANNUAL PHYSICAL EXAM: Primary | ICD-10-CM

## 2021-12-22 DIAGNOSIS — Z13.1 SCREENING FOR DIABETES MELLITUS: ICD-10-CM

## 2021-12-22 DIAGNOSIS — D63.1 ANEMIA DUE TO STAGE 3 CHRONIC KIDNEY DISEASE, UNSPECIFIED WHETHER STAGE 3A OR 3B CKD (HCC): ICD-10-CM

## 2021-12-22 DIAGNOSIS — F33.41 RECURRENT MAJOR DEPRESSIVE DISORDER, IN PARTIAL REMISSION (HCC): ICD-10-CM

## 2021-12-22 DIAGNOSIS — R53.83 FATIGUE, UNSPECIFIED TYPE: ICD-10-CM

## 2021-12-22 PROCEDURE — 1036F TOBACCO NON-USER: CPT | Performed by: FAMILY MEDICINE

## 2021-12-22 PROCEDURE — 3008F BODY MASS INDEX DOCD: CPT | Performed by: FAMILY MEDICINE

## 2021-12-22 PROCEDURE — 99396 PREV VISIT EST AGE 40-64: CPT | Performed by: FAMILY MEDICINE

## 2021-12-22 PROCEDURE — 3725F SCREEN DEPRESSION PERFORMED: CPT | Performed by: FAMILY MEDICINE

## 2021-12-22 RX ORDER — ECHINACEA PURPUREA EXTRACT 125 MG
1 TABLET ORAL AS NEEDED
COMMUNITY

## 2022-01-03 ENCOUNTER — TELEMEDICINE (OUTPATIENT)
Dept: FAMILY MEDICINE CLINIC | Facility: CLINIC | Age: 57
End: 2022-01-03
Payer: COMMERCIAL

## 2022-01-03 VITALS — TEMPERATURE: 95.7 F | OXYGEN SATURATION: 98 % | HEART RATE: 97 BPM

## 2022-01-03 DIAGNOSIS — J01.01 ACUTE RECURRENT MAXILLARY SINUSITIS: Primary | ICD-10-CM

## 2022-01-03 PROCEDURE — 1036F TOBACCO NON-USER: CPT | Performed by: FAMILY MEDICINE

## 2022-01-03 PROCEDURE — 99213 OFFICE O/P EST LOW 20 MIN: CPT | Performed by: FAMILY MEDICINE

## 2022-01-03 RX ORDER — CEFUROXIME AXETIL 500 MG/1
500 TABLET ORAL 2 TIMES DAILY WITH MEALS
Qty: 20 TABLET | Refills: 0 | Status: SHIPPED | OUTPATIENT
Start: 2022-01-03 | End: 2022-01-13

## 2022-01-03 NOTE — PROGRESS NOTES
COVID-19 Outpatient Progress Note    Assessment/Plan:    Problem List Items Addressed This Visit        Respiratory    Acute recurrent maxillary sinusitis - Primary    Relevant Medications    cefuroxime (CEFTIN) 500 mg tablet         Disposition:     After clarifying the patient's history, my suspicion for COVID-19 infection is very low  I have spent 15 minutes directly with the patient  Greater than 50% of this time was spent in counseling/coordination of care regarding: diagnostic results, prognosis, risks and benefits of treatment options, instructions for management, patient and family education, importance of treatment compliance, risk factor reductions and impressions  Encounter provider Xena Solo DO    Provider located at [address] [id number]    Recent Visits  No visits were found meeting these conditions  Showing recent visits within past 7 days and meeting all other requirements  Today's Visits  Date Type Provider Dept   01/03/22 Telemedicine [address] Jenn Murray DO  Shagufta Azar   Showing today's visits and meeting all other requirements  Future Appointments  No visits were found meeting these conditions  Showing future appointments within next 150 days and meeting all other requirements       Patient agrees to participate in a virtual check in via telephone or video visit instead of presenting to the office to address urgent/immediate medical needs  Patient is aware this is a billable service  After connecting through [facility], the patient was identified by name and date of birth  Steph Eller was informed that this was a telemedicine visit and that the exam was being conducted confidentially over secure lines  My office door was closed  No one else was in the room  Steph Eller acknowledged consent and understanding of privacy and security of the telemedicine visit   I informed the patient that I have reviewed her record in [address] [place] [address] and presented the opportunity for her to ask any questions regarding the visit today  The patient agreed to participate  Verification of patient location:  Patient is located in the following state in which I hold an active license: [place]    Subjective:   Anusha Mcfadden is a 55 y o  female who is concerned about COVID-19  Patient's symptoms include nasal congestion and cough  Patient denies fever, anosmia, loss of taste and shortness of breath  Date of symptom onset: 12/25/2021  COVID-19 vaccination status: Fully vaccinated (primary series)    Exposure:   Contact with a person who is under investigation (PUI) for or who is positive for COVID-19 within the last 14 days?: Yes    Hospitalized recently for fever and/or lower respiratory symptoms?: No      Currently a healthcare worker that is involved in direct patient care?: No      Works in a special setting where the risk of COVID-19 transmission may be high? (this may include long-term care, correctional and long-term facilities; homeless shelters; assisted-living facilities and group homes ): No      Resident in a special setting where the risk of COVID-19 transmission may be high? (this may include long-term care, correctional and long-term facilities; homeless shelters; assisted-living facilities and group homes ): No      Pt has been sick over the [place]  She did have her COV-19 booster vaccine 12/19/21  Corine Smalls has had an exposure to COV-19 -> but, she has had 2 at home tests NEGATIVE by her report for COV-19, and her boyfriend tested negative x 1 as well  No results found for: Elias Ormond, [address] [facility], [address] [place] [address],[address], [address] [facility]  History reviewed  No pertinent past medical history  History reviewed  No pertinent surgical history    Current Outpatient Medications   Medication Sig Dispense Refill    AVONEX PREFILLED 30 MCG/0 5ML PSKT       azelastine (ASTELIN) 0 1 % nasal spray 2 sprays into each nostril 2 (two) times a day as needed for rhinitis or allergies 30 mL 0    diazepam (VALIUM) 2 mg tablet TAKE 1/2 TABLET BY MOUTH EVERY MORNING PLUS 1 TAB AT BEDTIME  2    Epinastine HCl 0 05 % ophthalmic solution Apply 2 drops to eye 2 (two) times a day      escitalopram (LEXAPRO) 20 mg tablet Take 1 tablet (20 mg total) by mouth daily 30 tablet 11    hydrocortisone (WESTCORT) 0 2 % cream Apply topically 2 (two) times a day (Patient taking differently: Apply topically as needed  ) 60 g 1    losartan (COZAAR) 50 mg tablet TAKE 1 TABLET BY MOUTH EVERY DAY 90 tablet 1    mometasone (NASONEX) 50 mcg/act nasal spray USE 1 SPRAY INTO EACH NOSTRIL 2 TIMES A DAY NO MORE REFILLS MUST SCHEDULE AN APPOINTMENT 51 Act 1    sodium chloride (OCEAN) 0 65 % nasal spray 1 spray into each nostril as needed for congestion      Ascorbic Acid (VITAMIN C) 1000 MG tablet Take 1 tablet by mouth daily (Patient not taking: Reported on 12/22/2021 )      cefuroxime (CEFTIN) 500 mg tablet Take 1 tablet (500 mg total) by mouth 2 (two) times a day with meals for 10 days 20 tablet 0    Cholecalciferol (VITAMIN D) 2000 units CAPS Take 1 capsule by mouth (Patient not taking: Reported on 12/22/2021 )      Cyanocobalamin (VITAMIN B-12) 1000 MCG/15ML LIQD Take by mouth (Patient not taking: Reported on 12/22/2021 )      Glucosamine-Chondroit-Vit C-Mn (GLUCOSAMINE CHONDR 1500 COMPLX PO) Take 1 capsule by mouth daily (Patient not taking: Reported on 12/22/2021 )      Interferon Beta-1a (AVONEX PEN) 30 MCG/0 5ML AJKT Inject into the shoulder, thigh, or buttocks (Patient not taking: Reported on 12/22/2021 )      Interferon Beta-1a 30 MCG/0 5ML PSKT Inject 30 mcg into a muscle every 7 days (Patient not taking: Reported on 12/22/2021 )      Omega-3 Fatty Acids (FISH OIL) 1,000 mg Take 1 capsule by mouth daily (Patient not taking: Reported on 12/22/2021 )      valACYclovir (VALTREX) 1,000 mg tablet Take 1 tablet (1,000 mg total) by mouth 2 (two) times a day for 1 day (Patient taking differently: Take 1,000 mg by mouth as needed ) 2 tablet 4     No current facility-administered medications for this visit  Allergies   Allergen Reactions    Azithromycin GI Intolerance    Ciprofloxacin Hives    Codeine     Levofloxacin Hives    Penicillins GI Intolerance    Sulfamethoxazole-Trimethoprim Hives    Tamiflu [Oseltamivir] GI Intolerance    Doxycycline Rash    Lisinopril Cough and Other (See Comments)     cough         Review of Systems   Constitutional: Negative for fever  HENT: Positive for congestion and sinus pain  Pain into the back teeth  Respiratory: Positive for cough  Negative for shortness of breath  Objective:    Vitals:    01/03/22 1541   Pulse: 97   Temp: (!) 95 7 °F (35 4 °C)   TempSrc: Oral   SpO2: 98%       Physical Exam  Vitals reviewed  Constitutional:       General: She is not in acute distress  Appearance: Normal appearance  She is well-developed  She is not ill-appearing, toxic-appearing or diaphoretic  Comments: Nikia Arias has significant nasal congestion, but is non-toxic appearing; she is speaking in full sentences  HENT:      Head: Normocephalic and atraumatic  Eyes:      General: No scleral icterus  Conjunctiva/sclera: Conjunctivae normal    Pulmonary:      Effort: Pulmonary effort is normal  No respiratory distress  Neurological:      Mental Status: She is alert and oriented to person, place, and time  Psychiatric:         Mood and Affect: Mood normal          Behavior: Behavior normal          Thought Content: Thought content normal          Judgment: Judgment normal      Nikia Arias was seen today for headache, cough, sore throat, nasal congestion and covid-19  Diagnoses and all orders for this visit:    Acute recurrent maxillary sinusitis  Comments:  Pt stable  Treat with Ceftin, warm salt water gargles, OTC Cold Preps PRN, rest, & good hydration  Doubt COV-19 at this time    Orders:  -     cefuroxime (CEFTIN) 500 mg tablet; Take 1 tablet (500 mg total) by mouth 2 (two) times a day with meals for 10 days          VIRTUAL VISIT [facility] verbally agrees to participate in [employer]  Pt is aware that [employer] could be limited without vital signs or the ability to perform a full hands-on physical Evangelist Heather understands she or the provider may request at any time to terminate the video visit and request the patient to seek care or treatment in person

## 2022-02-12 NOTE — PROGRESS NOTES
Assessment/Plan:    Encounter for gynecological examination (general) (routine) without abnormal findings  All well, no complaints  Happy, engaged! Normal breast and pelvic exams  Pap done  Mammo order given for next month  Colonoscopy due 2026  Dexa @66       Diagnoses and all orders for this visit:    Encounter for gynecological examination (general) (routine) without abnormal findings    Encounter for screening mammogram for malignant neoplasm of breast  -     Mammo screening bilateral w 3d & cad; Future    Screening for malignant neoplasm of the cervix  -     IGP, Aptima HPV, Rfx 16/18,45          Subjective:      Patient ID: Agustin Ramirez is a 55 y o  female  Here for well check  The following portions of the patient's history were reviewed and updated as appropriate:   She  has a past medical history of Abnormal Pap smear of cervix (2020), Anemia, Anxiety, GERD (gastroesophageal reflux disease), Hypertension, MS (multiple sclerosis) ([facility] [address] ), and Varicella  She   Patient Active Problem List    Diagnosis Date Noted    Encounter for gynecological examination (general) (routine) without abnormal findings 02/14/2022    Lymphocytopenia 04/22/2019    Anemia due to stage 3 chronic kidney disease ([facility] [address] ) 04/22/2019    Multiple sclerosis ([facility] [address] ) 04/22/2019    Acute recurrent maxillary sinusitis 01/29/2018    Influenza 01/29/2018     She  has a past surgical history that includes Cholecystectomy (2004); ARTHROSCOPY KNEE (2005); Dilation and curettage of uterus (2018); Bone marrow biopsy (2019); Mammo (historical) (03/23/2021); Colposcopy (2019); and Colonoscopy (2016)  Her family history is not on file  She was adopted  She  reports that she has never smoked  She has never used smokeless tobacco  She reports current alcohol use of about 4 0 standard drinks of alcohol per week  She reports that she does not use drugs    Current Outpatient Medications   Medication Sig Dispense Refill    AVONEX PREFILLED 30 MCG/0 5ML PSKT       azelastine (ASTELIN) 0 1 % nasal spray 2 sprays into each nostril 2 (two) times a day as needed for rhinitis or allergies 30 mL 0    diazepam (VALIUM) 2 mg tablet TAKE 1/2 TABLET BY MOUTH EVERY MORNING PLUS 1 TAB AT BEDTIME  2    escitalopram (LEXAPRO) 20 mg tablet Take 1 tablet (20 mg total) by mouth daily 30 tablet 11    hydrocortisone (WESTCORT) 0 2 % cream Apply topically 2 (two) times a day (Patient taking differently: Apply topically as needed  ) 60 g 1    losartan (COZAAR) 50 mg tablet Take 1 tablet (50 mg total) by mouth daily 30 tablet 0    mometasone (NASONEX) 50 mcg/act nasal spray USE 1 SPRAY INTO EACH NOSTRIL 2 TIMES A DAY NO MORE REFILLS MUST SCHEDULE AN APPOINTMENT 51 Act 1    sodium chloride (OCEAN) 0 65 % nasal spray 1 spray into each nostril as needed for congestion      valACYclovir (VALTREX) 1,000 mg tablet Take 1 tablet (1,000 mg total) by mouth 2 (two) times a day for 1 day (Patient taking differently: Take 1,000 mg by mouth as needed ) 2 tablet 4    Epinastine HCl 0 05 % ophthalmic solution Apply 2 drops to eye 2 (two) times a day      Interferon Beta-1a (AVONEX PEN) 30 MCG/0 5ML AJKT Inject into the shoulder, thigh, or buttocks (Patient not taking: Reported on 12/22/2021 )       No current facility-administered medications for this visit  She is allergic to azithromycin, ciprofloxacin, codeine, levofloxacin, penicillins, sulfamethoxazole-trimethoprim, tamiflu [oseltamivir], doxycycline, and lisinopril       Review of Systems  No PMB, breast, bladder, bowel changes   No new persistent pain, bloating, early satiety or pelvic pressure      Objective:      /84 (BP Location: Left arm, Patient Position: Sitting, Cuff Size: Standard)   Ht 5' 7" (1 702 m)   Wt 115 kg (253 lb)   BMI 39 63 kg/m²          Physical Exam  General appearance: no distress, pleasant  Neck: thyroid without nodules or thyromegaly, no palpable adenopathy  Lymph nodes: no palpable adenopathy  Breasts: no masses, nodes or skin changes  Abdomen: soft, non tender, no palpable masses  Pelvic exam: normal atrophic external genitalia, urethral meatus normal, vagina atrophic without lesions, cervix atrophic without lesions, bimanual limited due to habitus,  uterus small, non tender, no adnexal masses, non tender  Rectal exam: normal sphincter tone, no masses, RV confirms above

## 2022-02-14 ENCOUNTER — ANNUAL EXAM (OUTPATIENT)
Dept: OBGYN CLINIC | Facility: CLINIC | Age: 57
End: 2022-02-14
Payer: COMMERCIAL

## 2022-02-14 VITALS
BODY MASS INDEX: 39.71 KG/M2 | SYSTOLIC BLOOD PRESSURE: 124 MMHG | WEIGHT: 253 LBS | HEIGHT: 67 IN | DIASTOLIC BLOOD PRESSURE: 84 MMHG

## 2022-02-14 DIAGNOSIS — Z12.31 ENCOUNTER FOR SCREENING MAMMOGRAM FOR MALIGNANT NEOPLASM OF BREAST: ICD-10-CM

## 2022-02-14 DIAGNOSIS — Z12.4 SCREENING FOR MALIGNANT NEOPLASM OF THE CERVIX: ICD-10-CM

## 2022-02-14 DIAGNOSIS — Z01.419 ENCOUNTER FOR GYNECOLOGICAL EXAMINATION (GENERAL) (ROUTINE) WITHOUT ABNORMAL FINDINGS: Primary | ICD-10-CM

## 2022-02-14 PROCEDURE — 3008F BODY MASS INDEX DOCD: CPT | Performed by: OBSTETRICS & GYNECOLOGY

## 2022-02-14 PROCEDURE — 1036F TOBACCO NON-USER: CPT | Performed by: OBSTETRICS & GYNECOLOGY

## 2022-02-14 PROCEDURE — 99396 PREV VISIT EST AGE 40-64: CPT | Performed by: OBSTETRICS & GYNECOLOGY

## 2022-02-14 NOTE — ASSESSMENT & PLAN NOTE
All well, no complaints  Happy, engaged! Normal breast and pelvic exams    Pap done  Mammo order given for next month  Colonoscopy due 2026  Dexa @66

## 2022-02-14 NOTE — LETTER
February 14, 2022     Linda Mejia, [address] [facility]  [address] [facility] [address]  [address] [place] [address]    Patient: Agustin Ramirez   YOB: 1965   Date of Visit: 2/14/2022       Dear Dr Jenn Alicea:    Thank you for referring Haider Hernandez to me for evaluation  Below are my notes for this consultation  If you have questions, please do not hesitate to call me  I look forward to following your patient along with you  Sincerely,        Candace Garcia MD        CC: No Recipients  Candace Garcia MD  2/14/2022  3:53 PM  Sign when Signing Visit  Assessment/Plan:    Encounter for gynecological examination (general) (routine) without abnormal findings  All well, no complaints  Happy, engaged! Normal breast and pelvic exams  Pap done  Mammo order given for next month  Colonoscopy due 2026  Dexa @66       Diagnoses and all orders for this visit:    Encounter for gynecological examination (general) (routine) without abnormal findings    Encounter for screening mammogram for malignant neoplasm of breast  -     Mammo screening bilateral w 3d & cad; Future    Screening for malignant neoplasm of the cervix  -     IGP, Aptima HPV, Rfx 16/18,45          Subjective:      Patient ID: Agustin Ramierz is a 55 y o  female  Here for well check  The following portions of the patient's history were reviewed and updated as appropriate:   She  has a past medical history of Abnormal Pap smear of cervix (2020), Anemia, Anxiety, GERD (gastroesophageal reflux disease), Hypertension, MS (multiple sclerosis) ([address] ), and Varicella    She   Patient Active Problem List    Diagnosis Date Noted    Encounter for gynecological examination (general) (routine) without abnormal findings 02/14/2022    Lymphocytopenia 04/22/2019    Anemia due to stage 3 chronic kidney disease ([address] ) 04/22/2019    Multiple sclerosis ([address] ) 04/22/2019    Acute recurrent maxillary sinusitis 01/29/2018    Influenza 01/29/2018     She  has a past surgical history that includes Cholecystectomy (2004); ARTHROSCOPY KNEE (2005); Dilation and curettage of uterus (2018); Bone marrow biopsy (2019); Mammo (historical) (03/23/2021); Colposcopy (2019); and Colonoscopy (2016)  Her family history is not on file  She was adopted  She  reports that she has never smoked  She has never used smokeless tobacco  She reports current alcohol use of about 4 0 standard drinks of alcohol per week  She reports that she does not use drugs  Current Outpatient Medications   Medication Sig Dispense Refill    AVONEX PREFILLED 30 MCG/0 5ML PSKT       azelastine (ASTELIN) 0 1 % nasal spray 2 sprays into each nostril 2 (two) times a day as needed for rhinitis or allergies 30 mL 0    diazepam (VALIUM) 2 mg tablet TAKE 1/2 TABLET BY MOUTH EVERY MORNING PLUS 1 TAB AT BEDTIME  2    escitalopram (LEXAPRO) 20 mg tablet Take 1 tablet (20 mg total) by mouth daily 30 tablet 11    hydrocortisone (WESTCORT) 0 2 % cream Apply topically 2 (two) times a day (Patient taking differently: Apply topically as needed  ) 60 g 1    losartan (COZAAR) 50 mg tablet Take 1 tablet (50 mg total) by mouth daily 30 tablet 0    mometasone (NASONEX) 50 mcg/act nasal spray USE 1 SPRAY INTO EACH NOSTRIL 2 TIMES A DAY NO MORE REFILLS MUST SCHEDULE AN APPOINTMENT 51 Act 1    sodium chloride (OCEAN) 0 65 % nasal spray 1 spray into each nostril as needed for congestion      valACYclovir (VALTREX) 1,000 mg tablet Take 1 tablet (1,000 mg total) by mouth 2 (two) times a day for 1 day (Patient taking differently: Take 1,000 mg by mouth as needed ) 2 tablet 4    Epinastine HCl 0 05 % ophthalmic solution Apply 2 drops to eye 2 (two) times a day      Interferon Beta-1a (AVONEX PEN) 30 MCG/0 5ML AJKT Inject into the shoulder, thigh, or buttocks (Patient not taking: Reported on 12/22/2021 )       No current facility-administered medications for this visit       She is allergic to azithromycin, ciprofloxacin, codeine, levofloxacin, penicillins, sulfamethoxazole-trimethoprim, tamiflu [oseltamivir], doxycycline, and lisinopril       Review of Systems  No PMB, breast, bladder, bowel changes   No new persistent pain, bloating, early satiety or pelvic pressure      Objective:      /84 (BP Location: Left arm, Patient Position: Sitting, Cuff Size: Standard)   Ht 5' 7" (1 702 m)   Wt 115 kg (253 lb)   BMI 39 63 kg/m²          Physical Exam  General appearance: no distress, pleasant  Neck: thyroid without nodules or thyromegaly, no palpable adenopathy  Lymph nodes: no palpable adenopathy  Breasts: no masses, nodes or skin changes  Abdomen: soft, non tender, no palpable masses  Pelvic exam: normal atrophic external genitalia, urethral meatus normal, vagina atrophic without lesions, cervix atrophic without lesions, bimanual limited due to habitus,  uterus small, non tender, no adnexal masses, non tender  Rectal exam: normal sphincter tone, no masses, RV confirms above

## 2022-02-17 ENCOUNTER — TELEPHONE (OUTPATIENT)
Dept: OTHER | Facility: OTHER | Age: 57
End: 2022-02-17

## 2022-02-17 LAB
CYTOLOGIST CVX/VAG CYTO: NORMAL
DX ICD CODE: NORMAL
HPV I/H RISK 4 DNA CVX QL PROBE+SIG AMP: NEGATIVE
OTHER STN SPEC: NORMAL
PATH REPORT.FINAL DX SPEC: NORMAL
SL AMB NOTE:: NORMAL
SL AMB SPECIMEN ADEQUACY: NORMAL
SL AMB TEST METHODOLOGY: NORMAL

## 2022-02-17 NOTE — TELEPHONE ENCOUNTER
Allyson Mayorga (Self) [phone] (M)     Is requesting a call back regarding the   Request for completion of Form to Work From Home

## 2022-04-06 DIAGNOSIS — I10 ESSENTIAL HYPERTENSION: Chronic | ICD-10-CM

## 2022-04-06 RX ORDER — LOSARTAN POTASSIUM 50 MG/1
TABLET ORAL
Qty: 30 TABLET | Refills: 0 | Status: SHIPPED | OUTPATIENT
Start: 2022-04-06 | End: 2022-04-06

## 2022-04-12 DIAGNOSIS — J30.1 ALLERGIC RHINITIS DUE TO POLLEN, UNSPECIFIED SEASONALITY: ICD-10-CM

## 2022-04-12 RX ORDER — MOMETASONE FUROATE 50 UG/1
1 SPRAY, METERED NASAL 2 TIMES DAILY
Qty: 17 G | Refills: 1 | Status: SHIPPED | OUTPATIENT
Start: 2022-04-12 | End: 2022-05-07

## 2022-05-07 DIAGNOSIS — J30.1 ALLERGIC RHINITIS DUE TO POLLEN, UNSPECIFIED SEASONALITY: ICD-10-CM

## 2022-05-07 RX ORDER — MOMETASONE FUROATE 50 UG/1
SPRAY, METERED NASAL
Qty: 17 G | Refills: 1 | Status: SHIPPED | OUTPATIENT
Start: 2022-05-07 | End: 2022-05-31

## 2022-05-19 LAB
T3FREE SERPL-MCNC: 2.6 PG/ML (ref 2–4.4)
T4 FREE SERPL-MCNC: 1.06 NG/DL (ref 0.82–1.77)
THYROGLOB AB SERPL-ACNC: <1 IU/ML (ref 0–0.9)
THYROPEROXIDASE AB SERPL-ACNC: 10 IU/ML (ref 0–34)
TSH SERPL DL<=0.005 MIU/L-ACNC: 3.85 UIU/ML (ref 0.45–4.5)

## 2022-05-21 NOTE — RESULT ENCOUNTER NOTE
Please let the patient know that their bloodwork was normal - Mookie's thyroid testing, including antibody levels, was normal   Thanks     Dr Jaret Martinez

## 2022-05-23 ENCOUNTER — TELEPHONE (OUTPATIENT)
Dept: FAMILY MEDICINE CLINIC | Facility: CLINIC | Age: 57
End: 2022-05-23

## 2022-05-23 NOTE — TELEPHONE ENCOUNTER
Dolores Cheung should hold off on using the Nasonex  I recommend getting seen down at ENT in [facility] 's Beverley Polanco, and Bhanu Pride  Thanks    Lizbeth

## 2022-05-23 NOTE — TELEPHONE ENCOUNTER
Patient called and stated that the last 4 weeks she has gotten a nose bleed once a week and then in the last 4 days she has had one every single day  She does take nasonex spray for her allergies  But she said that it just keeps happening and only on her left nostril, she wanted to know what else she can do or would you like her to be seen   Please advise

## 2022-05-31 DIAGNOSIS — J30.1 ALLERGIC RHINITIS DUE TO POLLEN, UNSPECIFIED SEASONALITY: ICD-10-CM

## 2022-05-31 RX ORDER — MOMETASONE FUROATE 50 UG/1
SPRAY, METERED NASAL
Qty: 51 G | Refills: 1 | Status: SHIPPED | OUTPATIENT
Start: 2022-05-31

## 2022-07-05 DIAGNOSIS — B00.1 HERPES LABIALIS: ICD-10-CM

## 2022-07-05 RX ORDER — VALACYCLOVIR HYDROCHLORIDE 1 G/1
1000 TABLET, FILM COATED ORAL 2 TIMES DAILY
Qty: 2 TABLET | Refills: 0 | Status: SHIPPED | OUTPATIENT
Start: 2022-07-05 | End: 2022-07-06

## 2022-09-26 ENCOUNTER — OFFICE VISIT (OUTPATIENT)
Dept: FAMILY MEDICINE CLINIC | Facility: CLINIC | Age: 57
End: 2022-09-26
Payer: COMMERCIAL

## 2022-09-26 VITALS
WEIGHT: 252 LBS | SYSTOLIC BLOOD PRESSURE: 120 MMHG | OXYGEN SATURATION: 100 % | RESPIRATION RATE: 14 BRPM | TEMPERATURE: 98.3 F | BODY MASS INDEX: 39.55 KG/M2 | HEIGHT: 67 IN | HEART RATE: 82 BPM | DIASTOLIC BLOOD PRESSURE: 82 MMHG

## 2022-09-26 DIAGNOSIS — Z11.4 SCREENING FOR HIV (HUMAN IMMUNODEFICIENCY VIRUS): ICD-10-CM

## 2022-09-26 DIAGNOSIS — N18.30 ANEMIA DUE TO STAGE 3 CHRONIC KIDNEY DISEASE, UNSPECIFIED WHETHER STAGE 3A OR 3B CKD (HCC): ICD-10-CM

## 2022-09-26 DIAGNOSIS — Z13.6 SCREENING FOR CARDIOVASCULAR CONDITION: ICD-10-CM

## 2022-09-26 DIAGNOSIS — Z12.31 ENCOUNTER FOR SCREENING MAMMOGRAM FOR BREAST CANCER: ICD-10-CM

## 2022-09-26 DIAGNOSIS — Z11.59 NEED FOR HEPATITIS C SCREENING TEST: ICD-10-CM

## 2022-09-26 DIAGNOSIS — R73.02 IGT (IMPAIRED GLUCOSE TOLERANCE): ICD-10-CM

## 2022-09-26 DIAGNOSIS — I10 PRIMARY HYPERTENSION: ICD-10-CM

## 2022-09-26 DIAGNOSIS — F32.A ANXIETY AND DEPRESSION: ICD-10-CM

## 2022-09-26 DIAGNOSIS — E66.3 OVERWEIGHT: ICD-10-CM

## 2022-09-26 DIAGNOSIS — G35 MULTIPLE SCLEROSIS (HCC): Primary | Chronic | ICD-10-CM

## 2022-09-26 DIAGNOSIS — F41.9 ANXIETY AND DEPRESSION: ICD-10-CM

## 2022-09-26 DIAGNOSIS — R79.89 ABNORMAL TSH: ICD-10-CM

## 2022-09-26 DIAGNOSIS — D63.1 ANEMIA DUE TO STAGE 3 CHRONIC KIDNEY DISEASE, UNSPECIFIED WHETHER STAGE 3A OR 3B CKD (HCC): ICD-10-CM

## 2022-09-26 PROCEDURE — 3725F SCREEN DEPRESSION PERFORMED: CPT | Performed by: FAMILY MEDICINE

## 2022-09-26 PROCEDURE — 3079F DIAST BP 80-89 MM HG: CPT | Performed by: FAMILY MEDICINE

## 2022-09-26 PROCEDURE — 99214 OFFICE O/P EST MOD 30 MIN: CPT | Performed by: FAMILY MEDICINE

## 2022-09-26 PROCEDURE — 3074F SYST BP LT 130 MM HG: CPT | Performed by: FAMILY MEDICINE

## 2022-09-26 NOTE — PROGRESS NOTES
FAMILY PRACTICE OFFICE VISIT       NAME: João Olson  AGE: 65 y o  SEX: female       : 1965        MRN: [id number]    DATE: 2022  TIME: 1:26 PM    Assessment and Plan   1  Multiple sclerosis ([facility] )  Comments:  Pt stable on exam - continues f/u with [address]  2  IGT (impaired glucose tolerance)  Comments:  Chronic; stable - to continue a lower carb/salt diet, light exercise, and slowly working on wt loss  Previously ordered FBW not done yet - A1c/FBW reordered  Orders:  -     Comprehensive metabolic panel; Future  -     HEMOGLOBIN A1C W/ EAG ESTIMATION; Future    3  Anemia due to stage 3 chronic kidney disease, unspecified whether stage 3a or 3b CKD ([facility] )  Comments:  Chronic; stable - has seen Nephrology  Continue to follow  Orders:  -     Comprehensive metabolic panel; Future  -     CBC and differential; Future    4  Primary hypertension  Comments:  Controlled on present management  Pt is considering re-establishing with her Nutritionist as well  5  Anxiety and depression  Comments:  Controlled with Lexapro  Supportive home life right now  She is working on changes at work  6  Abnormal TSH  Comments:  Hx of - last testing was very reassuring  Continue to follow  Orders:  -     TSH, 3rd generation with Free T4 reflex; Future    7  Encounter for screening mammogram for breast cancer  Comments:  Mammogram ordered  Orders:  -     Mammo screening bilateral w 3d & cad; Future; Expected date: 2022    8  Need for hepatitis C screening test  Comments:  One time screen  Orders:  -     Hepatitis C Antibody ([facility]); Future    9  Screening for HIV (human immunodeficiency virus)  Comments:  One time screen  Orders:  -     HIV 1/2 Antigen/Antibody (4th Generation) w Reflex [facility]; Future    10  Screening for cardiovascular condition  -     Lipid Panel with Direct LDL reflex; Future    11  Overweight  Comments:  Referring to Weight Loss Management    Orders:  -     Ambulatory Referral to Weight Management; Future         There are no Patient Instructions on file for this visit  Chief Complaint     Chief Complaint   Patient presents with    Follow-up     Patient being seen for 6 month follow up        History of Present Illness   Kianna Leahy is a 65y o -year-old female who presents in f/u today  Stresses at work  Last thyroid testing was normal   Sees hospitals Neurology  Review of Systems   Review of Systems   Constitutional: Negative for activity change  Respiratory: Negative for shortness of breath  Cardiovascular: Negative for chest pain  Gastrointestinal: Negative for abdominal pain and blood in stool  Genitourinary: Negative for hematuria  Neurological:        MS reported has been stable  Psychiatric/Behavioral: Negative for dysphoric mood  Stress with work - had to file an HR claim at work, applied for a new job with [employer], etc   Reports that her home life have been good         Active Problem List     Patient Active Problem List   Diagnosis    Acute recurrent maxillary sinusitis    Influenza    Lymphocytopenia    Anemia due to stage 3 chronic kidney disease ([facility] [address] )    Multiple sclerosis ([facility] )    Encounter for gynecological examination (general) (routine) without abnormal findings         Past Medical History:  Past Medical History:   Diagnosis Date    Abnormal Pap smear of cervix 2020    Been a patient for many years see file    Anemia     Anxiety     GERD (gastroesophageal reflux disease)     Hypertension     MS (multiple sclerosis) ([facility] )     Varicella     as a child       Past Surgical History:  Past Surgical History:   Procedure Laterality Date    ARTHROSCOPY KNEE  2005    BONE MARROW BIOPSY  2019    CHOLECYSTECTOMY  2004    COLONOSCOPY  2016    Due 2026    COLPOSCOPY  2019 2018    [address] OF UTERUS  2018    2007    MAMMO (HISTORICAL)  03/23/2021    BIRADS 2       Family History:  Family History   Adopted: Yes       Social History:  Social History     Socioeconomic History    Marital status:      Spouse name: Not on file    Number of children: Not on file    Years of education: Not on file    Highest education level: Not on file   Occupational History    Not on file   Tobacco Use    Smoking status: Never Smoker    Smokeless tobacco: Never Used   Vaping Use    Vaping Use: Never used   Substance and Sexual Activity    Alcohol use: Yes     Alcohol/week: 4 0 standard drinks     Types: 4 Glasses of wine per week     Comment: weekends    Drug use: No    Sexual activity: Yes     Partners: Male     Birth control/protection: Male Sterilization     Comment: Tested for everything, same partner 2 5+ years   Other Topics Concern    Not on file   Social History Narrative    Exercise: No    Marital status: Domestic Partner    Domestic violence: No    Pets: Cats    History of drug/alcohol abuse denies         Social Determinants of Health     Financial Resource Strain: Not on file   Food Insecurity: Not on file   Transportation Needs: Not on file   Physical Activity: Not on file   Stress: Not on file   Social Connections: Not on file   Intimate Partner Violence: Not on file   Housing Stability: Not on file       Objective     Vitals:    09/26/22 1253   BP: 120/82   Pulse: 82   Resp: 14   Temp: 98 3 °F (36 8 °C)   SpO2: 100%     Wt Readings from Last 3 Encounters:   09/26/22 114 kg (252 lb)   02/14/22 115 kg (253 lb)   12/22/21 114 kg (251 lb 3 2 oz)       Physical Exam  Vitals and nursing note reviewed  Constitutional:       General: She is not in acute distress  Appearance: Normal appearance  She is not ill-appearing, toxic-appearing or diaphoretic  HENT:      Head: Normocephalic and atraumatic  Eyes:      General: No scleral icterus  Conjunctiva/sclera: Conjunctivae normal    Cardiovascular:      Rate and Rhythm: Normal rate and regular rhythm  Heart sounds: Normal heart sounds   No murmur heard     No friction rub  No gallop  Pulmonary:      Effort: Pulmonary effort is normal  No respiratory distress  Breath sounds: Normal breath sounds  No stridor  No wheezing, rhonchi or rales  Musculoskeletal:      Cervical back: Normal range of motion and neck supple  No rigidity or tenderness  Lymphadenopathy:      Cervical: No cervical adenopathy  Neurological:      Mental Status: She is alert and oriented to person, place, and time  Psychiatric:         Mood and Affect: Mood normal          Behavior: Behavior normal          Thought Content: Thought content normal          Judgment: Judgment normal       Comments: Mildly anxious today, speaking about work           Pertinent Laboratory/Diagnostic Studies:  Lab Results   Component Value Date    BUN 14 02/24/2020    CREATININE 1 19 (H) 02/24/2020    K 4 2 02/24/2020    CO2 24 02/24/2020     02/24/2020     Lab Results   Component Value Date    ALT 13 02/24/2020    AST 18 02/24/2020       Lab Results   Component Value Date    WBC 3 5 02/24/2020    HGB 12 0 02/24/2020    HCT 36 5 02/24/2020    MCV 98 (H) 02/24/2020     02/24/2020       Lab Results   Component Value Date    TSH 3 850 05/18/2022       No results found for: CHOL  Lab Results   Component Value Date    TRIG 136 02/24/2020     Lab Results   Component Value Date    HDL 77 02/24/2020     Lab Results   Component Value Date    LDLCALC 119 (H) 02/24/2020     No results found for: HGBA1C    Results for orders placed or performed in visit on 05/18/22   TSH WITH REFLEX TO FREE T4   Result Value Ref Range    TSH 3 850 0 450 - 4 500 uIU/mL    Free t4 1 06 0 82 - 1 77 ng/dL   T3, free   Result Value Ref Range    Free T3 2 6 2 0 - 4 4 pg/mL   Thyroid Antibodies Panel   Result Value Ref Range    THYROID MICROSOMAL ANTIBODY 10 0 - 34 IU/mL    Thyroglobulin Ab <1 0 0 0 - 0 9 IU/mL       Orders Placed This Encounter   Procedures    Mammo screening bilateral w 3d & cad    Hepatitis C Antibody ([facility])    HIV 1/2 Antigen/Antibody (4th Generation) w Reflex [facility]    Comprehensive metabolic panel    HEMOGLOBIN A1C W/ EAG ESTIMATION    CBC and differential    TSH, 3rd generation with Free T4 reflex    Lipid Panel with Direct LDL reflex    Ambulatory Referral to Weight Management       ALLERGIES:  Allergies   Allergen Reactions    Azithromycin GI Intolerance    Cephalosporins Hives and Itching    Ciprofloxacin Hives    Codeine     Levofloxacin Hives    Penicillins GI Intolerance    Sulfamethoxazole-Trimethoprim Hives    Tamiflu [Oseltamivir] GI Intolerance    Doxycycline Rash    Lisinopril Cough and Other (See Comments)     cough         Current Medications     Current Outpatient Medications   Medication Sig Dispense Refill    AVONEX PREFILLED 30 MCG/0 5ML PSKT       azelastine (ASTELIN) 0 1 % nasal spray 2 sprays into each nostril 2 (two) times a day as needed for rhinitis or allergies 30 mL 0    diazepam (VALIUM) 2 mg tablet TAKE 1/2 TABLET BY MOUTH EVERY MORNING PLUS 1 TAB AT BEDTIME  2    Epinastine HCl 0 05 % ophthalmic solution Apply 2 drops to eye 2 (two) times a day      escitalopram (LEXAPRO) 20 mg tablet Take 1 tablet (20 mg total) by mouth daily 30 tablet 11    hydrocortisone (WESTCORT) 0 2 % cream Apply topically 2 (two) times a day (Patient taking differently: Apply topically as needed) 60 g 1    losartan (COZAAR) 50 mg tablet Take 1 tablet (50 mg total) by mouth daily 90 tablet 1    mometasone (NASONEX) 50 mcg/act nasal spray USE 1 SPRAY INTO EACH NOSTRIL TWICE A DAY 51 g 1    sodium chloride (OCEAN) 0 65 % nasal spray 1 spray into each nostril as needed for congestion (Patient not taking: Reported on 9/26/2022)      valACYclovir (VALTREX) 1,000 mg tablet Take 1 tablet (1,000 mg total) by mouth 2 (two) times a day for 1 day 2 tablet 0     No current facility-administered medications for this visit           Health Maintenance     Health Maintenance   Topic Date Due    Hepatitis C Screening  Never done    HIV Screening  Never done    Colorectal Cancer Screening  Never done    DTaP,Tdap,and Td Vaccines (2 - Td or Tdap) 09/19/2021    COVID-19 Vaccine (3 - Booster for Pfizer series) 10/01/2021    Breast Cancer Screening: Mammogram  03/23/2022    Influenza Vaccine (1) 09/01/2022    BMI: Followup Plan  12/22/2022    Annual Physical  02/14/2023    Depression Screening  09/26/2023    BMI: Adult  09/26/2023    Cervical Cancer Screening  02/14/2027    Pneumococcal Vaccine: Pediatrics (0 to 5 Years) and At-Risk Patients (6 to 59 Years)  Aged Out    HIB Vaccine  Aged Out    Hepatitis B Vaccine  Aged Out    IPV Vaccine  Aged Out    Hepatitis A Vaccine  Aged Out    Meningococcal ACWY Vaccine  Aged Out    HPV Vaccine  Aged [employer] History   Administered Date(s) Administered    COVID-19 PFIZER VACCINE 0 3 ML IM 04/10/2021, 05/01/2021    INFLUENZA 10/01/2015, 11/08/2018, 12/19/2020, 10/18/2021    Influenza Injectable, MDCK, Preservative Free, Quadrivalent, 0 5 mL 01/06/2020    Influenza Quadrivalent Recombinant Preservative Free IM 10/18/2021    Influenza, recombinant, quadrivalent,injectable, preservative free 12/17/2020    Influenza, seasonal, injectable 10/01/2015    Pneumococcal Polysaccharide PPV23 10/10/2011    Tdap 09/19/2011    Zoster 10/19/2021    Zoster Vaccine Recombinant 10/19/2021, 12/29/2021     BMI Counseling: Body mass index is 39 47 kg/m²  The BMI is above normal  Nutrition recommendations include moderation in carbohydrate intake  Exercise recommendations include exercising 3-5 times per week  No pharmacotherapy was ordered  Patient referred to weight management and PCP  Rationale for BMI follow-up plan is due to patient being overweight or obese  Discussed numerous options  Depression Screening and Follow-up Plan: Patient was screened for depression during today's encounter   They screened negative with a PHQ-2 score of 0          Annette Romero DO

## 2022-10-07 LAB
EXTERNAL HIV CONFIRMATION: NORMAL
EXTERNAL HIV SCREEN: NORMAL

## 2022-10-25 ENCOUNTER — TELEPHONE (OUTPATIENT)
Dept: ADMINISTRATIVE | Facility: OTHER | Age: 57
End: 2022-10-25

## 2022-10-25 NOTE — TELEPHONE ENCOUNTER
Upon review of the In Basket request we were able to locate, review, and update the patient chart as requested for HIV  Any additional questions or concerns should be emailed to the Practice Liaisons via the appropriate education email address, please do not reply via In Basket      Thank you  Waleska Chowdhury

## 2022-10-25 NOTE — TELEPHONE ENCOUNTER
----- Message from Christopher Nassar LPN sent at [id number]/[phone]  2:56 PM EDT -----  Regarding: care gap request  10/24/22 2:56 PM    Hello, our patient attached above has had HIV completed/performed  Please assist in updating the patient chart by pulling the document from the Media Tab (Date 10/24/22)  The date of service is 10/7/2022        Thank you,  Christopher Diaz FP

## 2022-11-06 DIAGNOSIS — I10 ESSENTIAL HYPERTENSION: Chronic | ICD-10-CM

## 2022-11-07 RX ORDER — LOSARTAN POTASSIUM 50 MG/1
TABLET ORAL
Qty: 90 TABLET | Refills: 1 | Status: SHIPPED | OUTPATIENT
Start: 2022-11-07

## 2022-12-13 DIAGNOSIS — J30.1 ALLERGIC RHINITIS DUE TO POLLEN, UNSPECIFIED SEASONALITY: ICD-10-CM

## 2022-12-13 DIAGNOSIS — B00.1 HERPES LABIALIS: ICD-10-CM

## 2022-12-13 RX ORDER — VALACYCLOVIR HYDROCHLORIDE 1 G/1
1000 TABLET, FILM COATED ORAL 2 TIMES DAILY
Qty: 2 TABLET | Refills: 0 | Status: SHIPPED | OUTPATIENT
Start: 2022-12-13 | End: 2022-12-14

## 2022-12-13 RX ORDER — MOMETASONE FUROATE 50 UG/1
SPRAY, METERED NASAL
Qty: 51 ACT | Refills: 1 | Status: SHIPPED | OUTPATIENT
Start: 2022-12-13

## 2023-02-07 DIAGNOSIS — Z12.31 ENCOUNTER FOR SCREENING MAMMOGRAM FOR BREAST CANCER: ICD-10-CM

## 2023-02-16 NOTE — PROGRESS NOTES
Assessment/Plan:    Encounter for gynecological examination (general) (routine) without abnormal findings  Here for well check  Noted increase in cervical mucous followed by light bleeding for one day this month  LMP 2019   Normal breast and pelvic exams  Last pap 2/2022 neg/HPV neg; due next time  Mammo order given, last 1/23/23  Colonoscopy 2016, due 2026  Dexa @66, no risk factors    PMB (postmenopausal bleeding)  One day of light bleeding following increase in cervical mucous  Suspect escape ovulation with LMP 3 years ago, but discussed risk for endometrial neoplasia  U/S ordered, will RTO if EMS >4 mm for endo bx at that time  Agrees to plan  Diagnoses and all orders for this visit:    Encounter for gynecological examination (general) (routine) without abnormal findings    PMB (postmenopausal bleeding)  -     US pelvis complete w transvaginal; Future    Other orders  -     Liquid-based pap, diagnostic  -     Zinc Acetate 50 MG CAPS; Take 1 tablet by mouth          Subjective:      Patient ID: Jacki Garcia is a 65 y o  female  HPI Here for well check  The following portions of the patient's history were reviewed and updated as appropriate:   She  has a past medical history of Abnormal Pap smear of cervix (2020), Anemia, Anxiety, GERD (gastroesophageal reflux disease), Hypertension, MS (multiple sclerosis) ([facility] [address] ), and Varicella  She   Patient Active Problem List    Diagnosis Date Noted   • PMB (postmenopausal bleeding) 02/20/2023   • Encounter for gynecological examination (general) (routine) without abnormal findings 02/14/2022   • Lymphocytopenia 04/22/2019   • Anemia due to stage 3 chronic kidney disease ([address] ) 04/22/2019   • Multiple sclerosis ([facility] [facility] ) 04/22/2019   • Acute recurrent maxillary sinusitis 01/29/2018   • Influenza 01/29/2018     She  has a past surgical history that includes Cholecystectomy (2004); ARTHROSCOPY KNEE (2005); Dilation and curettage of uterus (2018);  Bone marrow biopsy (2019); Mammo (historical) (01/24/2023); Colposcopy (2019); and Colonoscopy (2016)  Her family history is not on file  She was adopted  She  reports that she has never smoked  She has never used smokeless tobacco  She reports current alcohol use of about 4 0 standard drinks per week  She reports that she does not use drugs  Current Outpatient Medications   Medication Sig Dispense Refill   • Azelastine HCl 137 MCG/SPRAY SOLN SPRAY 2 SPRAYS INTO EACH NOSTRIL 2 (TWO) TIMES A DAY AS NEEDED FOR RHINITIS OR ALLERGIES 90 mL 2   • diazepam (VALIUM) 2 mg tablet TAKE 1/2 TABLET BY MOUTH EVERY MORNING PLUS 1 TAB AT BEDTIME  2   • Epinastine HCl 0 05 % ophthalmic solution Apply 2 drops to eye 2 (two) times a day     • escitalopram (LEXAPRO) 20 mg tablet Take 1 tablet (20 mg total) by mouth daily 30 tablet 11   • losartan (COZAAR) 50 mg tablet TAKE 1 TABLET BY MOUTH EVERY DAY 90 tablet 1   • mometasone (NASONEX) 50 mcg/act nasal spray USE 1 SPRAY INTO EACH NOSTRIL TWICE A DAY 51 Act 1   • sodium chloride (OCEAN) 0 65 % nasal spray 1 spray into each nostril as needed for congestion     • valACYclovir (VALTREX) 1,000 mg tablet Take 1 tablet (1,000 mg total) by mouth 2 (two) times a day for 1 day 2 tablet 0   • AVONEX PREFILLED 30 MCG/0 5ML PSKT      • Zinc Acetate 50 MG CAPS Take 1 tablet by mouth       No current facility-administered medications for this visit  She is allergic to azithromycin, cephalosporins, ciprofloxacin, codeine, levofloxacin, penicillins, sulfamethoxazole-trimethoprim, tamiflu [oseltamivir], doxycycline, and lisinopril       Review of Systems  No breast, bladder, bowel changes   No new persistent pain, bloating, early satiety or pelvic pressure  One day of light bleeding 2/2023    Objective:      /68 (BP Location: Left arm, Patient Position: Sitting, Cuff Size: Standard)   Ht 5' 6" (1 676 m)   Wt 115 kg (254 lb)   Breastfeeding No   BMI 41 00 kg/m²          Physical Exam    General appearance: no distress, pleasant  Neck: thyroid without nodules or thyromegaly, no palpable adenopathy  Lymph nodes: no palpable adenopathy  Breasts: no masses, nodes or skin changes  Abdomen: soft, non tender, no palpable masses  Pelvic exam: normal atrophic external genitalia, urethral meatus normal, vagina atrophic without lesions, cervix atrophic without lesions, bimanual limited due to habitus, uterus small, non tender, no adnexal masses, non tender  Rectal exam: normal sphincter tone, no masses, RV confirms above

## 2023-02-20 ENCOUNTER — ANNUAL EXAM (OUTPATIENT)
Dept: OBGYN CLINIC | Facility: CLINIC | Age: 58
End: 2023-02-20

## 2023-02-20 VITALS
HEIGHT: 66 IN | WEIGHT: 254 LBS | BODY MASS INDEX: 40.82 KG/M2 | SYSTOLIC BLOOD PRESSURE: 110 MMHG | DIASTOLIC BLOOD PRESSURE: 68 MMHG

## 2023-02-20 DIAGNOSIS — N95.0 PMB (POSTMENOPAUSAL BLEEDING): ICD-10-CM

## 2023-02-20 DIAGNOSIS — Z01.419 ENCOUNTER FOR GYNECOLOGICAL EXAMINATION (GENERAL) (ROUTINE) WITHOUT ABNORMAL FINDINGS: Primary | ICD-10-CM

## 2023-02-20 NOTE — ASSESSMENT & PLAN NOTE
One day of light bleeding following increase in cervical mucous  Suspect escape ovulation with LMP 3 years ago, but discussed risk for endometrial neoplasia  U/S ordered, will RTO if EMS >4 mm for endo bx at that time  Agrees to plan

## 2023-02-20 NOTE — PATIENT INSTRUCTIONS
Return to office in one year unless having any problems such as breast changes, bleeding, new persistent pain, new progressive bloating, new problems eating (getting full to quickly) or new constant urinary pressure that does not resolve in one week  Call in six months to schedule your annual visit  Schedule your pelvic ultrasound and call if you do not hear about the results in 10-14 days  Continue to strive for 1200 mg of calcium and 1000 IU of vitamin D daily in diet and supplements combined  You can only absorb 600 mg of calcium at a time  Avoid excess calcium as this may adversely effect your heart

## 2023-02-20 NOTE — LETTER
February 20, 2023     Kaylie Amado, [address]  [address] [place] [address]    Patient: Hue Carreno   YOB: 1965   Date of Visit: 2/20/2023       Dear Dr Narayan Bourgeois:    Thank you for referring Radha Gay to me for evaluation  Below are my notes for this consultation  If you have questions, please do not hesitate to call me  I look forward to following your patient along with you  Sincerely,        Enzo Lombard, MD        CC: No Recipients  Enzo Lombard, MD  2/20/2023 11:45 AM  Sign when Signing Visit  Assessment/Plan:    Encounter for gynecological examination (general) (routine) without abnormal findings  Here for well check  Noted increase in cervical mucous followed by light bleeding for one day this month  LMP 2019   Normal breast and pelvic exams  Last pap 2/2022 neg/HPV neg; due next time  Mammo order given, last 1/23/23  Colonoscopy 2016, due 2026  Dexa @66, no risk factors    PMB (postmenopausal bleeding)  One day of light bleeding following increase in cervical mucous  Suspect escape ovulation with LMP 3 years ago, but discussed risk for endometrial neoplasia  U/S ordered, will RTO if EMS >4 mm for endo bx at that time  Agrees to plan  Diagnoses and all orders for this visit:    Encounter for gynecological examination (general) (routine) without abnormal findings    PMB (postmenopausal bleeding)  -     US pelvis complete w transvaginal; Future    Other orders  -     Liquid-based pap, diagnostic  -     Zinc Acetate 50 MG CAPS; Take 1 tablet by mouth         Subjective:     Patient ID: Hue Carreno is a 65 y o  female  HPI Here for well check  The following portions of the patient's history were reviewed and updated as appropriate:   She  has a past medical history of Abnormal Pap smear of cervix (2020), Anemia, Anxiety, GERD (gastroesophageal reflux disease), Hypertension, MS (multiple sclerosis) ([address] ), and Varicella    She   Patient Active Problem List    Diagnosis Date Noted   • PMB (postmenopausal bleeding) 02/20/2023   • Encounter for gynecological examination (general) (routine) without abnormal findings 02/14/2022   • Lymphocytopenia 04/22/2019   • Anemia due to stage 3 chronic kidney disease ([address] ) 04/22/2019   • Multiple sclerosis ([facility] ) 04/22/2019   • Acute recurrent maxillary sinusitis 01/29/2018   • Influenza 01/29/2018     She  has a past surgical history that includes Cholecystectomy (2004); ARTHROSCOPY KNEE (2005); Dilation and curettage of uterus (2018); Bone marrow biopsy (2019); Mammo (historical) (01/24/2023); Colposcopy (2019); and Colonoscopy (2016)  Her family history is not on file  She was adopted  She  reports that she has never smoked  She has never used smokeless tobacco  She reports current alcohol use of about 4 0 standard drinks per week  She reports that she does not use drugs  Current Outpatient Medications   Medication Sig Dispense Refill   • Azelastine HCl 137 MCG/SPRAY SOLN SPRAY 2 SPRAYS INTO EACH NOSTRIL 2 (TWO) TIMES A DAY AS NEEDED FOR RHINITIS OR ALLERGIES 90 mL 2   • diazepam (VALIUM) 2 mg tablet TAKE 1/2 TABLET BY MOUTH EVERY MORNING PLUS 1 TAB AT BEDTIME  2   • Epinastine HCl 0 05 % ophthalmic solution Apply 2 drops to eye 2 (two) times a day     • escitalopram (LEXAPRO) 20 mg tablet Take 1 tablet (20 mg total) by mouth daily 30 tablet 11   • losartan (COZAAR) 50 mg tablet TAKE 1 TABLET BY MOUTH EVERY DAY 90 tablet 1   • mometasone (NASONEX) 50 mcg/act nasal spray USE 1 SPRAY INTO EACH NOSTRIL TWICE A DAY 51 Act 1   • sodium chloride (OCEAN) 0 65 % nasal spray 1 spray into each nostril as needed for congestion     • valACYclovir (VALTREX) 1,000 mg tablet Take 1 tablet (1,000 mg total) by mouth 2 (two) times a day for 1 day 2 tablet 0   • AVONEX PREFILLED 30 MCG/0 5ML PSKT      • Zinc Acetate 50 MG CAPS Take 1 tablet by mouth       No current facility-administered medications for this visit       She is allergic to azithromycin, cephalosporins, ciprofloxacin, codeine, levofloxacin, penicillins, sulfamethoxazole-trimethoprim, tamiflu [oseltamivir], doxycycline, and lisinopril       Review of Systems No breast, bladder, bowel changes   No new persistent pain, bloating, early satiety or pelvic pressure  One day of light bleeding 2/2023    Objective:      /68 (BP Location: Left arm, Patient Position: Sitting, Cuff Size: Standard)   Ht 5' 6" (1 676 m)   Wt 115 kg (254 lb)   Breastfeeding No   BMI 41 00 kg/m²         Physical Exam   General appearance: no distress, pleasant  Neck: thyroid without nodules or thyromegaly, no palpable adenopathy  Lymph nodes: no palpable adenopathy  Breasts: no masses, nodes or skin changes  Abdomen: soft, non tender, no palpable masses  Pelvic exam: normal atrophic external genitalia, urethral meatus normal, vagina atrophic without lesions, cervix atrophic without lesions, bimanual limited due to habitus, uterus small, non tender, no adnexal masses, non tender  Rectal exam: normal sphincter tone, no masses, RV confirms above

## 2023-02-20 NOTE — ASSESSMENT & PLAN NOTE
Here for well check  Noted increase in cervical mucous followed by light bleeding for one day this month  LMP 2019   Normal breast and pelvic exams  Last pap 2/2022 neg/HPV neg; due next time    Mammo order given, last 1/23/23  Colonoscopy 2016, due 2026  Dexa @66, no risk factors

## 2023-03-01 ENCOUNTER — OFFICE VISIT (OUTPATIENT)
Dept: FAMILY MEDICINE CLINIC | Facility: CLINIC | Age: 58
End: 2023-03-01

## 2023-03-01 VITALS
HEIGHT: 66 IN | WEIGHT: 256 LBS | RESPIRATION RATE: 16 BRPM | BODY MASS INDEX: 41.14 KG/M2 | OXYGEN SATURATION: 99 % | HEART RATE: 95 BPM | DIASTOLIC BLOOD PRESSURE: 80 MMHG | TEMPERATURE: 98.2 F | SYSTOLIC BLOOD PRESSURE: 116 MMHG

## 2023-03-01 DIAGNOSIS — J01.01 ACUTE RECURRENT MAXILLARY SINUSITIS: ICD-10-CM

## 2023-03-01 DIAGNOSIS — J34.89 SINUS PAIN: Primary | ICD-10-CM

## 2023-03-01 RX ORDER — PREDNISONE 20 MG/1
40 TABLET ORAL DAILY
Qty: 10 TABLET | Refills: 0 | Status: SHIPPED | OUTPATIENT
Start: 2023-03-01 | End: 2023-03-06

## 2023-03-01 RX ORDER — CEFUROXIME AXETIL 500 MG/1
500 TABLET ORAL 2 TIMES DAILY WITH MEALS
Qty: 20 TABLET | Refills: 0 | Status: SHIPPED | OUTPATIENT
Start: 2023-03-01 | End: 2023-03-11

## 2023-03-28 ENCOUNTER — PATIENT MESSAGE (OUTPATIENT)
Dept: OBGYN CLINIC | Facility: CLINIC | Age: 58
End: 2023-03-28

## 2023-05-03 NOTE — PROGRESS NOTES
Assessment/Plan:    PMB (postmenopausal bleeding)  One day in February  U/s reviewed with thin endometrium and complex endocervical fluid with known cervical stenosis  2018 hysteroscopy and D&C with copious endocervical mucous after dilation  Agreed to dilation in office today, accomplished with ease  Endocervical and endometrial biopsies obtained  Will contact with results  Diagnoses and all orders for this visit:    PMB (postmenopausal bleeding)  -     Histology Specimen  -     Endometrial biopsy    Other orders  -     Glucos-Chondroit-Hyaluron-MSM (Glucosamine Chondroitin Joint) TABS; Take 1 tablet by mouth daily  -     ascorbic acid (VITAMIN C) 1000 MG tablet; Take 1 tablet by mouth          Subjective:      Patient ID: Matias Santoyo is a 65 y o  female  HPI Here for cervical dilation - see u/s    The following portions of the patient's history were reviewed and updated as appropriate:   She  has a past medical history of Abnormal Pap smear of cervix (2020), Anemia, Anxiety, GERD (gastroesophageal reflux disease), Hypertension, MS (multiple sclerosis) ([facility] ), and Varicella  She   Patient Active Problem List    Diagnosis Date Noted    PMB (postmenopausal bleeding) 02/20/2023    Encounter for gynecological examination (general) (routine) without abnormal findings 02/14/2022    Lymphocytopenia 04/22/2019    Anemia due to stage 3 chronic kidney disease ([facility] ) 04/22/2019    Multiple sclerosis ([facility] ) 04/22/2019    Acute recurrent maxillary sinusitis 01/29/2018    Influenza 01/29/2018     She  has a past surgical history that includes Cholecystectomy (2004); ARTHROSCOPY KNEE (2005); Dilation and curettage of uterus (2018); Bone marrow biopsy (2019); Mammo (historical) (01/24/2023); Colposcopy (2019); and Colonoscopy (2016)  Her family history is not on file  She was adopted  She  reports that she has never smoked   She has never used smokeless tobacco  She reports current alcohol use of about 4 0 "standard drinks per week  She reports that she does not use drugs  Current Outpatient Medications   Medication Sig Dispense Refill    ascorbic acid (VITAMIN C) 1000 MG tablet Take 1 tablet by mouth      AVONEX PREFILLED 30 MCG/0 5ML PSKT       Azelastine HCl 137 MCG/SPRAY SOLN SPRAY 2 SPRAYS INTO EACH NOSTRIL 2 (TWO) TIMES A DAY AS NEEDED FOR RHINITIS OR ALLERGIES 90 mL 2    Calcium Carbonate-Vit D-Min (CALCIUM 1200 PO) Take by mouth      diazepam (VALIUM) 2 mg tablet Take 2 mg by mouth daily 1 TABLET DAILY IN THE MORNING, 1 TABLET AT BEDTIME PRN  2    Epinastine HCl 0 05 % ophthalmic solution Apply 2 drops to eye 2 (two) times a day      escitalopram (LEXAPRO) 20 mg tablet Take 1 tablet (20 mg total) by mouth daily 30 tablet 11    Glucos-Chondroit-Hyaluron-MSM (Glucosamine Chondroitin Joint) TABS Take 1 tablet by mouth daily      losartan (COZAAR) 50 mg tablet TAKE 1 TABLET BY MOUTH EVERY DAY 90 tablet 1    mometasone (NASONEX) 50 mcg/act nasal spray USE 1 SPRAY INTO EACH NOSTRIL TWICE A DAY 51 Act 1    sodium chloride (OCEAN) 0 65 % nasal spray 1 spray into each nostril as needed for congestion      valACYclovir (VALTREX) 1,000 mg tablet Take 1 tablet (1,000 mg total) by mouth 2 (two) times a day for 1 day 2 tablet 0    Zinc Acetate 50 MG CAPS Take 1 tablet by mouth       No current facility-administered medications for this visit  She is allergic to azithromycin, cephalosporins, ciprofloxacin, codeine, levofloxacin, penicillins, sulfamethoxazole-trimethoprim, tamiflu [oseltamivir], doxycycline, and lisinopril       Review of Systems  Last bleeding 2/2023 x one day    Objective:      /88 (BP Location: Right arm, Patient Position: Sitting, Cuff Size: Large)   Ht 5' 6 5\" (1 689 m)   Wt 117 kg (258 lb 12 8 oz)   BMI 41 15 kg/m²          Physical Exam    Appears well, no apparent distress  Does not appear anxious or depressed    Pelvic exam: normal external genitalia, vagina normal no " abnormal discharge, cervix stenotic external os and without lesions    Endometrial biopsy    Date/Time: 5/4/2023 1:00 PM  Performed by: Josselin Malone MD  Authorized by: Josselin Malone MD   Universal Protocol:  Consent: Verbal consent obtained  Written consent obtained  Risks and benefits: risks, benefits and alternatives were discussed  Consent given by: patient  Patient understanding: patient states understanding of the procedure being performed  Patient consent: the patient's understanding of the procedure matches consent given  Relevant documents: relevant documents present and verified  Patient identity confirmed: verbally with patient      Indication:     Indications: Other disorder of menstruation and other abnormal bleeding from female genital tract    Procedure:     Procedure: endometrial biopsy with Pipelle      Procedure comment:   and endocervical curettage with kevorkian curette    A bivalve speculum was placed in the vagina: yes      Cervix cleaned and prepped: yes      A paracervical block was performed: no      The cervix was dilated: yes      Uterus sounded: yes      Uterus sound depth (cm):  6    Specimen collected: specimen collected and sent to pathology      Patient tolerated procedure well with no complications: yes    Findings:     Cervix: stenotic      Cervix comment:  Able to bypass with dilators  Comments:     Procedure comments:  Cervix dilated with thick strand of mucous retrieved  Endo bx and ECC done  Tolerated well  Data: 3/28/23 ultrasound: uterus 5 8 cm with 1 8 mm EMS  Cervical canal 8 6 mm with complex fluid and tubular shaped structure  R ov 1 6 and L ov 1 7 cm, both WNL

## 2023-05-04 ENCOUNTER — OFFICE VISIT (OUTPATIENT)
Dept: OBGYN CLINIC | Facility: CLINIC | Age: 58
End: 2023-05-04

## 2023-05-04 VITALS
WEIGHT: 258.8 LBS | HEIGHT: 67 IN | BODY MASS INDEX: 40.62 KG/M2 | DIASTOLIC BLOOD PRESSURE: 88 MMHG | SYSTOLIC BLOOD PRESSURE: 146 MMHG

## 2023-05-04 DIAGNOSIS — I10 ESSENTIAL HYPERTENSION: Chronic | ICD-10-CM

## 2023-05-04 DIAGNOSIS — N95.0 PMB (POSTMENOPAUSAL BLEEDING): Primary | ICD-10-CM

## 2023-05-04 RX ORDER — LOSARTAN POTASSIUM 50 MG/1
TABLET ORAL
Qty: 90 TABLET | Refills: 1 | Status: SHIPPED | OUTPATIENT
Start: 2023-05-04

## 2023-05-04 RX ORDER — GLUCOSAM/MSM/CHOND/HYALURON AC 750-60-150
1 TABLET ORAL DAILY
COMMUNITY

## 2023-05-04 NOTE — ASSESSMENT & PLAN NOTE
One day in February  U/s reviewed with thin endometrium and complex endocervical fluid with known cervical stenosis  2018 hysteroscopy and D&C with copious endocervical mucous after dilation  Agreed to dilation in office today, accomplished with ease  Endocervical and endometrial biopsies obtained  Will contact with results

## 2023-05-08 ENCOUNTER — OFFICE VISIT (OUTPATIENT)
Dept: FAMILY MEDICINE CLINIC | Facility: CLINIC | Age: 58
End: 2023-05-08

## 2023-05-08 VITALS
SYSTOLIC BLOOD PRESSURE: 128 MMHG | BODY MASS INDEX: 40.18 KG/M2 | HEIGHT: 67 IN | OXYGEN SATURATION: 99 % | DIASTOLIC BLOOD PRESSURE: 86 MMHG | WEIGHT: 256 LBS | RESPIRATION RATE: 14 BRPM | TEMPERATURE: 96.6 F | HEART RATE: 73 BPM

## 2023-05-08 DIAGNOSIS — N18.30 ANEMIA DUE TO STAGE 3 CHRONIC KIDNEY DISEASE, UNSPECIFIED WHETHER STAGE 3A OR 3B CKD (HCC): ICD-10-CM

## 2023-05-08 DIAGNOSIS — D63.1 ANEMIA DUE TO STAGE 3 CHRONIC KIDNEY DISEASE, UNSPECIFIED WHETHER STAGE 3A OR 3B CKD (HCC): ICD-10-CM

## 2023-05-08 DIAGNOSIS — F32.A ANXIETY AND DEPRESSION: ICD-10-CM

## 2023-05-08 DIAGNOSIS — I10 PRIMARY HYPERTENSION: ICD-10-CM

## 2023-05-08 DIAGNOSIS — F41.9 ANXIETY AND DEPRESSION: ICD-10-CM

## 2023-05-08 DIAGNOSIS — G35 MULTIPLE SCLEROSIS (HCC): Primary | Chronic | ICD-10-CM

## 2023-05-08 NOTE — PROGRESS NOTES
FAMILY PRACTICE OFFICE VISIT       NAME: Boo Sommers  AGE: 65 y o  SEX: female       : 1965        MRN: [id number]    DATE: 2023  TIME: 8:47 AM    Assessment and Plan   1  Multiple sclerosis ([facility] )  Comments:  MS remains stable  Has upcoming MRIs for [address] [place] [address] Neurology  2  Primary hypertension  Comments:  Controlled on present management  Trinity Bellamy is to continue a lower carb/salt diet, and remain active  3  Anemia due to stage 3 chronic kidney disease, unspecified whether stage 3a or 3b CKD ([facility] )  Comments:  Stable - continues f/u with Nephrology  4  Anxiety and depression  Comments:  Stable - continues to work with her Therapist and Psychiatrist   Work remains a stressor for her  There are no Patient Instructions on file for this visit  Chief Complaint     Chief Complaint   Patient presents with   • Follow-up     Patient being seen for 6 month follow up        History of Present Illness   Boo Sommers is a 65y o -year-old female who presents in f/u  Continues f/u with [facility] Neurology, Nephrology, Psychiatry in [facility]), etc   Previously ordered FBW not done as of yet - reprinted today  Saw GYN (Dr Gabino Black)  Getting hot flashes at night with menopause  May be starting up NOOM for weight loss  Colonoscopy in 2017 -> due in 4 more years  Review of Systems   Review of Systems   Constitutional: Negative for activity change  Respiratory: Negative for shortness of breath  Cardiovascular: Negative for chest pain  Neurological: Negative for weakness  Psychiatric/Behavioral: Negative for dysphoric mood  The patient is not nervous/anxious          Active Problem List     Patient Active Problem List   Diagnosis   • Acute recurrent maxillary sinusitis   • Influenza   • Lymphocytopenia   • Anemia due to stage 3 chronic kidney disease ([facility] )   • Multiple sclerosis ([facility] )   • Encounter for gynecological examination (general) (routine) without abnormal findings   • PMB (postmenopausal bleeding)         Past Medical History:  Past Medical History:   Diagnosis Date   • Abnormal Pap smear of cervix 2020    Been a patient for many years see file   • Anemia    • Anxiet     including health anxiety   • GERD (gastroesophageal reflux disease)    • Hypertension    • MS (multiple sclerosis) ([facility] )    • Varicella     as a child       Past Surgical History:  Past Surgical History:   Procedure Laterality Date   • ARTHROSCOPY KNEE  2005   • BONE MARROW BIOPSY  2019   • CHOLECYSTECTOMY  2004   • COLONOSCOPY  2016    Due 2026   • COLPOSCOPY  2019 2018   • DILATION AND CURETTAGE OF UTERUS  2018 2007   • MAMMO (HISTORICAL)  01/24/2023    BIRADS 2B       Family History:  Family History   Adopted: Yes       Social History:  Social History     Socioeconomic History   • Marital status:      Spouse name: Not on file   • Number of children: Not on file   • Years of education: Not on file   • Highest education level: Not on file   Occupational History   • Not on file   Tobacco Use   • Smoking status: Never   • Smokeless tobacco: Never   Vaping Use   • Vaping Use: Never used   Substance and Sexual Activity   • Alcohol use:  Yes     Alcohol/week: 4 0 standard drinks     Types: 4 Glasses of wine per week     Comment: socially on weekends   • Drug use: No   • Sexual activity: Yes     Partners: Male     Birth control/protection: Post-menopausal, Male Sterilization     Comment: Tested for everything, same partner 2 5+ years   Other Topics Concern   • Not on file   Social History Narrative    Exercise: No    Marital status: Domestic Partner    Domestic violence: No    Pets: Cats    History of drug/alcohol abuse denies         Social Determinants of Health     Financial Resource Strain: Not on file   Food Insecurity: Not on file   Transportation Needs: Not on file   Physical Activity: Not on file   Stress: Not on file   Social Connections: Not on file   Intimate Partner Violence: Not on file   Housing Stability: Not on file       Objective     Vitals:    05/08/23 0821   BP: 128/86   Pulse: 73   Resp: 14   Temp: (!) 96 6 °F (35 9 °C)   SpO2: 99%     Wt Readings from Last 3 Encounters:   05/08/23 116 kg (256 lb)   05/04/23 117 kg (258 lb 12 8 oz)   03/01/23 116 kg (256 lb)       Physical Exam  Vitals and nursing note reviewed  Constitutional:       General: She is not in acute distress  Appearance: Normal appearance  She is not ill-appearing, toxic-appearing or diaphoretic  HENT:      Head: Normocephalic and atraumatic  Eyes:      General: No scleral icterus  Conjunctiva/sclera: Conjunctivae normal    Cardiovascular:      Rate and Rhythm: Normal rate and regular rhythm  Heart sounds: Normal heart sounds  No murmur heard  No friction rub  No gallop  Pulmonary:      Effort: Pulmonary effort is normal  No respiratory distress  Breath sounds: Normal breath sounds  No stridor  No wheezing, rhonchi or rales  Musculoskeletal:      Cervical back: Normal range of motion and neck supple  No rigidity or tenderness  Lymphadenopathy:      Cervical: No cervical adenopathy  Neurological:      Mental Status: She is alert and oriented to person, place, and time  Psychiatric:         Mood and Affect: Mood normal          Behavior: Behavior normal          Thought Content:  Thought content normal          Judgment: Judgment normal          Pertinent Laboratory/Diagnostic Studies:  Lab Results   Component Value Date    BUN 14 02/24/2020    CREATININE 1 19 (H) 02/24/2020    K 4 2 02/24/2020    CO2 24 02/24/2020     02/24/2020     Lab Results   Component Value Date    ALT 13 02/24/2020    AST 18 02/24/2020       Lab Results   Component Value Date    WBC 3 5 02/24/2020    HGB 12 0 02/24/2020    HCT 36 5 02/24/2020    MCV 98 (H) 02/24/2020     02/24/2020       Lab Results   Component Value Date    TSH 3 850 05/18/2022       No results found for: CHOL  Lab Results   Component Value Date TRIG 136 02/24/2020     Lab Results   Component Value Date    HDL 77 02/24/2020     Lab Results   Component Value Date    LDLCALC 119 (H) 02/24/2020     No results found for: HGBA1C    Results for orders placed or performed in visit on 10/25/22   Human Immunodeficiency Virus 1/2 Antigen / Antibody ( Fourth Generation) with Reflex Testing   Result Value Ref Range    HIV Screen Non-Reactive     HIV Confirmation Non-Reactive        No orders of the defined types were placed in this encounter        ALLERGIES:  Allergies   Allergen Reactions   • Azithromycin GI Intolerance   • Cephalosporins Hives and Itching   • Ciprofloxacin Hives   • Codeine    • Levofloxacin Hives   • Penicillins GI Intolerance   • Sulfamethoxazole-Trimethoprim Hives     Bactrim   • Tamiflu [Oseltamivir] GI Intolerance   • Doxycycline Rash   • Lisinopril Cough and Other (See Comments)     cough         Current Medications     Current Outpatient Medications   Medication Sig Dispense Refill   • ascorbic acid (VITAMIN C) 1000 MG tablet Take 1 tablet by mouth     • AVONEX PREFILLED 30 MCG/0 5ML PSKT      • Azelastine HCl 137 MCG/SPRAY SOLN SPRAY 2 SPRAYS INTO EACH NOSTRIL 2 (TWO) TIMES A DAY AS NEEDED FOR RHINITIS OR ALLERGIES 90 mL 2   • Calcium Carbonate-Vit D-Min (CALCIUM 1200 PO) Take by mouth     • diazepam (VALIUM) 2 mg tablet Take 2 mg by mouth daily 1 TABLET DAILY IN THE MORNING, 1 TABLET AT BEDTIME PRN  2   • Epinastine HCl 0 05 % ophthalmic solution Apply 2 drops to eye 2 (two) times a day     • escitalopram (LEXAPRO) 20 mg tablet Take 1 tablet (20 mg total) by mouth daily 30 tablet 11   • Glucos-Chondroit-Hyaluron-MSM (Glucosamine Chondroitin Joint) TABS Take 1 tablet by mouth daily     • losartan (COZAAR) 50 mg tablet TAKE 1 TABLET BY MOUTH EVERY DAY 90 tablet 1   • mometasone (NASONEX) 50 mcg/act nasal spray USE 1 SPRAY INTO EACH NOSTRIL TWICE A DAY 51 Act 1   • sodium chloride (OCEAN) 0 65 % nasal spray 1 spray into each nostril as needed for congestion     • Zinc Acetate 50 MG CAPS Take 1 tablet by mouth     • valACYclovir (VALTREX) 1,000 mg tablet Take 1 tablet (1,000 mg total) by mouth 2 (two) times a day for 1 day 2 tablet 0     No current facility-administered medications for this visit           Health Maintenance     Health Maintenance   Topic Date Due   • Hepatitis C Screening  Never done   • Colorectal Cancer Screening  Never done   • BMI: Followup Plan  09/26/2023   • Breast Cancer Screening: Mammogram  01/24/2024   • Annual Physical  02/20/2024   • Depression Screening  05/08/2024   • BMI: Adult  05/08/2024   • Cervical Cancer Screening  02/14/2027   • DTaP,Tdap,and Td Vaccines (3 - Td or Tdap) 12/30/2032   • HIV Screening  Completed   • Influenza Vaccine  Completed   • COVID-19 Vaccine  Completed   • Pneumococcal Vaccine: Pediatrics (0 to 5 Years) and At-Risk Patients (6 to 59 Years)  Aged Out   • HIB Vaccine  Aged Out   • IPV Vaccine  Aged Out   • Hepatitis A Vaccine  Aged Out   • Meningococcal ACWY Vaccine  Aged Out   • HPV Vaccine  Aged [employer] History   Administered Date(s) Administered   • COVID-19 PFIZER VACCINE 0 3 ML IM 04/10/2021, 05/01/2021, 12/19/2021   • COVID-19 Pfizer Vac BIVALENT Markie-sucrose 12 Yr+ IM (BOOSTER ONLY) 09/28/2022   • INFLUENZA 10/01/2015, 11/08/2018, 12/19/2020, 10/18/2021, 12/30/2022   • Influenza Injectable, MDCK, Preservative Free, Quadrivalent, 0 5 mL 01/06/2020   • Influenza Quadrivalent Recombinant Preservative Free IM 10/18/2021   • Influenza, recombinant, quadrivalent,injectable, preservative free 12/17/2020   • Influenza, seasonal, injectable 10/01/2015   • Pneumococcal Polysaccharide PPV23 10/10/2011   • Tdap 09/19/2011, 12/30/2022   • Zoster 10/19/2021   • Zoster Vaccine Recombinant 10/19/2021, 12/29/2021          Felipe Granado DO

## 2023-05-15 LAB
PATH REPORT.COMMENTS IMP SPEC: NORMAL
PATH REPORT.SITE OF ORIGIN SPEC: NORMAL
PAYMENT PROCEDURE: NORMAL
SL AMB .: NORMAL

## 2023-05-16 ENCOUNTER — TELEPHONE (OUTPATIENT)
Dept: OBGYN CLINIC | Facility: CLINIC | Age: 58
End: 2023-05-16

## 2023-05-19 LAB
ALBUMIN SERPL-MCNC: 4.3 G/DL (ref 3.8–4.9)
ALBUMIN/GLOB SERPL: 1.8 {RATIO} (ref 1.2–2.2)
ALP SERPL-CCNC: 83 IU/L (ref 44–121)
ALT SERPL-CCNC: 75 IU/L (ref 0–32)
AST SERPL-CCNC: 97 IU/L (ref 0–40)
BASOPHILS # BLD AUTO: 0 X10E3/UL (ref 0–0.2)
BASOPHILS NFR BLD AUTO: 1 %
BILIRUB SERPL-MCNC: 0.5 MG/DL (ref 0–1.2)
BUN SERPL-MCNC: 15 MG/DL (ref 6–24)
BUN/CREAT SERPL: 12 (ref 9–23)
CALCIUM SERPL-MCNC: 9.8 MG/DL (ref 8.7–10.2)
CHLORIDE SERPL-SCNC: 104 MMOL/L (ref 96–106)
CHOLEST SERPL-MCNC: 212 MG/DL (ref 100–199)
CHOLEST/HDLC SERPL: 3.3 RATIO (ref 0–4.4)
CO2 SERPL-SCNC: 22 MMOL/L (ref 20–29)
CREAT SERPL-MCNC: 1.23 MG/DL (ref 0.57–1)
EGFR: 52 ML/MIN/1.73
EOSINOPHIL # BLD AUTO: 0.1 X10E3/UL (ref 0–0.4)
EOSINOPHIL NFR BLD AUTO: 2 %
ERYTHROCYTE [DISTWIDTH] IN BLOOD BY AUTOMATED COUNT: 12 % (ref 11.7–15.4)
EST. AVERAGE GLUCOSE BLD GHB EST-MCNC: 123 MG/DL
GLOBULIN SER-MCNC: 2.4 G/DL (ref 1.5–4.5)
GLUCOSE SERPL-MCNC: 100 MG/DL (ref 70–99)
HBA1C MFR BLD: 5.9 % (ref 4.8–5.6)
HCT VFR BLD AUTO: 39 % (ref 34–46.6)
HCV AB S/CO SERPL IA: NON REACTIVE
HDLC SERPL-MCNC: 65 MG/DL
HGB BLD-MCNC: 13.3 G/DL (ref 11.1–15.9)
HIV 1+2 AB+HIV1 P24 AG SERPL QL IA: NON REACTIVE
IMM GRANULOCYTES # BLD: 0 X10E3/UL (ref 0–0.1)
IMM GRANULOCYTES NFR BLD: 0 %
LDLC SERPL DIRECT ASSAY-MCNC: 113 MG/DL (ref 0–99)
LYMPHOCYTES # BLD AUTO: 1.1 X10E3/UL (ref 0.7–3.1)
LYMPHOCYTES NFR BLD AUTO: 36 %
MCH RBC QN AUTO: 33.2 PG (ref 26.6–33)
MCHC RBC AUTO-ENTMCNC: 34.1 G/DL (ref 31.5–35.7)
MCV RBC AUTO: 97 FL (ref 79–97)
MONOCYTES # BLD AUTO: 0.3 X10E3/UL (ref 0.1–0.9)
MONOCYTES NFR BLD AUTO: 8 %
NEUTROPHILS # BLD AUTO: 1.7 X10E3/UL (ref 1.4–7)
NEUTROPHILS NFR BLD AUTO: 53 %
PLATELET # BLD AUTO: 154 X10E3/UL (ref 150–450)
POTASSIUM SERPL-SCNC: 4.9 MMOL/L (ref 3.5–5.2)
PROT SERPL-MCNC: 6.7 G/DL (ref 6–8.5)
RBC # BLD AUTO: 4.01 X10E6/UL (ref 3.77–5.28)
SL AMB T4, FREE (DIRECT): 1.01 NG/DL (ref 0.82–1.77)
SODIUM SERPL-SCNC: 140 MMOL/L (ref 134–144)
TRIGL SERPL-MCNC: 192 MG/DL (ref 0–149)
TSH SERPL DL<=0.005 MIU/L-ACNC: 5 UIU/ML (ref 0.45–4.5)
WBC # BLD AUTO: 3.1 X10E3/UL (ref 3.4–10.8)

## 2023-06-06 DIAGNOSIS — J30.1 ALLERGIC RHINITIS DUE TO POLLEN, UNSPECIFIED SEASONALITY: ICD-10-CM

## 2023-06-06 RX ORDER — MOMETASONE FUROATE 50 UG/1
SPRAY, METERED NASAL
Qty: 51 ACT | Refills: 1 | Status: SHIPPED | OUTPATIENT
Start: 2023-06-06

## 2023-08-10 DIAGNOSIS — J01.01 ACUTE RECURRENT MAXILLARY SINUSITIS: ICD-10-CM

## 2023-08-10 RX ORDER — AZELASTINE HYDROCHLORIDE 137 UG/1
SPRAY, METERED NASAL
Qty: 90 ML | Refills: 2 | Status: SHIPPED | OUTPATIENT
Start: 2023-08-10

## 2023-10-27 DIAGNOSIS — I10 ESSENTIAL HYPERTENSION: Chronic | ICD-10-CM

## 2023-10-27 RX ORDER — LOSARTAN POTASSIUM 50 MG/1
TABLET ORAL
Qty: 90 TABLET | Refills: 1 | Status: SHIPPED | OUTPATIENT
Start: 2023-10-27

## 2024-02-21 PROBLEM — Z01.419 ENCOUNTER FOR GYNECOLOGICAL EXAMINATION (GENERAL) (ROUTINE) WITHOUT ABNORMAL FINDINGS: Status: RESOLVED | Noted: 2022-02-14 | Resolved: 2024-02-21

## 2024-02-21 PROBLEM — J11.1 INFLUENZA: Status: RESOLVED | Noted: 2018-01-29 | Resolved: 2024-02-21

## 2024-02-21 PROBLEM — J01.01 ACUTE RECURRENT MAXILLARY SINUSITIS: Status: RESOLVED | Noted: 2018-01-29 | Resolved: 2024-02-21

## 2024-03-14 ENCOUNTER — PATIENT MESSAGE (OUTPATIENT)
Dept: OBGYN CLINIC | Facility: CLINIC | Age: 59
End: 2024-03-14

## 2024-03-14 DIAGNOSIS — Z12.31 ENCOUNTER FOR SCREENING MAMMOGRAM FOR MALIGNANT NEOPLASM OF BREAST: Primary | ICD-10-CM

## 2024-03-20 DIAGNOSIS — J01.01 ACUTE RECURRENT MAXILLARY SINUSITIS: ICD-10-CM

## 2024-03-20 DIAGNOSIS — I10 ESSENTIAL HYPERTENSION: Chronic | ICD-10-CM

## 2024-03-20 RX ORDER — LOSARTAN POTASSIUM 50 MG/1
TABLET ORAL
Qty: 90 TABLET | Refills: 1 | Status: SHIPPED | OUTPATIENT
Start: 2024-03-20

## 2024-03-20 RX ORDER — AZELASTINE HYDROCHLORIDE 137 UG/1
SPRAY, METERED NASAL
Qty: 90 ML | Refills: 1 | Status: SHIPPED | OUTPATIENT
Start: 2024-03-20

## 2024-04-05 NOTE — PROGRESS NOTES
Assessment/Plan:    Encounter for gynecological examination (general) (routine) without abnormal findings  Here for well check,   Normal breast and pelvic exams.  Last pap 2/2022 neg/HPV neg; repeated today  Mammo order given, last 1/24/23  Colonoscopy 2016, due 2026  Dexa @66, no risk factors. Calcium recs reviewed.  Pelvic u/s ordered for h/o complex fluid in cervix. Of note, cervix patent today.       Diagnoses and all orders for this visit:    Encounter for gynecological examination (general) (routine) without abnormal findings    Encounter for screening mammogram for malignant neoplasm of breast  -     Mammo screening bilateral w 3d & cad; Future    Screening for malignant neoplasm of the cervix  -     IGP, Aptima HPV, Rfx 16/18,45    Abnormal ultrasound of pelvis  -     US pelvis complete w transvaginal; Future          Subjective:      Patient ID: Mookie Barker is a 58 y.o. female.    HPI Here for well check.    The following portions of the patient's history were reviewed and updated as appropriate: She  has a past medical history of Abnormal Pap smear of cervix (2020), Anemia, Anxiet, GERD (gastroesophageal reflux disease), Hypertension, MS (multiple sclerosis) (HCC), and Varicella.  She   Patient Active Problem List    Diagnosis Date Noted    PMB (postmenopausal bleeding) 02/20/2023    Lymphocytopenia 04/22/2019    Anemia due to stage 3 chronic kidney disease  (HCC) 04/22/2019    Multiple sclerosis (HCC) 04/22/2019     She  has a past surgical history that includes Cholecystectomy (2004); ARTHROSCOPY KNEE (2005); Dilation and curettage of uterus (2018); Bone marrow biopsy (2019); Mammo (historical) (01/24/2023); Colposcopy (2019); and Colonoscopy (2016).  Her family history is not on file. She was adopted.  She  reports that she has never smoked. She has never used smokeless tobacco. She reports current alcohol use of about 4.0 standard drinks of alcohol per week. She reports that she does not use  "drugs.  Current Outpatient Medications   Medication Sig Dispense Refill    ascorbic acid (VITAMIN C) 1000 MG tablet Take 1 tablet by mouth      AVONEX PREFILLED 30 MCG/0.5ML PSKT       Azelastine HCl 137 MCG/SPRAY SOLN SPRAY 2 SPRAYS INTO EACH NOSTRIL 2 (TWO) TIMES A DAY AS NEEDED FOR RHINITIS OR ALLERGIES 90 mL 1    Calcium Carbonate-Vit D-Min (CALCIUM 1200 PO) Take by mouth      diazepam (VALIUM) 2 mg tablet Take 2 mg by mouth daily 1 TABLET DAILY IN THE MORNING, 1 TABLET AT BEDTIME PRN  2    escitalopram (LEXAPRO) 20 mg tablet Take 1 tablet (20 mg total) by mouth daily 30 tablet 11    losartan (COZAAR) 50 mg tablet TAKE 1 TABLET BY MOUTH EVERY DAY 90 tablet 1    mometasone (NASONEX) 50 mcg/act nasal spray SPRAY 1 SPRAY INTO EACH NOSTRIL TWICE A DAY 51 Act 1    sodium chloride (OCEAN) 0.65 % nasal spray 1 spray into each nostril as needed for congestion      valACYclovir (VALTREX) 1,000 mg tablet Take 1 tablet (1,000 mg total) by mouth 2 (two) times a day for 1 day 2 tablet 0    Zinc Acetate 50 MG CAPS Take 1 tablet by mouth      Epinastine HCl 0.05 % ophthalmic solution Apply 2 drops to eye 2 (two) times a day       No current facility-administered medications for this visit.     She is allergic to azithromycin, cephalosporins, ciprofloxacin, codeine, levofloxacin, penicillins, sulfamethoxazole-trimethoprim, tamiflu [oseltamivir], doxycycline, and lisinopril..    Review of Systems  No PMB, breast, bladder, bowel changes. No new persistent pain, bloating, early satiety or pelvic pressure      Objective:      /86 (BP Location: Left arm, Patient Position: Sitting, Cuff Size: Standard)   Ht 5' 6.5\" (1.689 m)   Wt 117 kg (258 lb 12.8 oz)   Breastfeeding No   BMI 41.15 kg/m²          Physical Exam    General appearance: no distress, pleasant  Neck: thyroid without nodules or thyromegaly, no palpable adenopathy  Lymph nodes: no palpable adenopathy  Breasts: no masses, nodes or skin changes  Abdomen: soft, non " tender, no palpable masses  Pelvic exam: normal atrophic external genitalia, urethral meatus normal, vagina atrophic without lesions, cervix atrophic without lesions, open and able to obtain endocervical sampling with cytobrush, bimanual limited due to habitus, uterus small, non tender, no adnexal masses, non tender  Rectal exam: normal sphincter tone, no masses, RV confirms above

## 2024-04-08 ENCOUNTER — OFFICE VISIT (OUTPATIENT)
Dept: OBGYN CLINIC | Facility: CLINIC | Age: 59
End: 2024-04-08
Payer: COMMERCIAL

## 2024-04-08 VITALS
BODY MASS INDEX: 40.62 KG/M2 | SYSTOLIC BLOOD PRESSURE: 122 MMHG | HEIGHT: 67 IN | DIASTOLIC BLOOD PRESSURE: 86 MMHG | WEIGHT: 258.8 LBS

## 2024-04-08 DIAGNOSIS — Z12.31 ENCOUNTER FOR SCREENING MAMMOGRAM FOR MALIGNANT NEOPLASM OF BREAST: ICD-10-CM

## 2024-04-08 DIAGNOSIS — Z12.4 SCREENING FOR MALIGNANT NEOPLASM OF THE CERVIX: ICD-10-CM

## 2024-04-08 DIAGNOSIS — R93.89 ABNORMAL ULTRASOUND OF PELVIS: ICD-10-CM

## 2024-04-08 DIAGNOSIS — Z01.419 ENCOUNTER FOR GYNECOLOGICAL EXAMINATION (GENERAL) (ROUTINE) WITHOUT ABNORMAL FINDINGS: Primary | ICD-10-CM

## 2024-04-08 PROCEDURE — 99396 PREV VISIT EST AGE 40-64: CPT | Performed by: OBSTETRICS & GYNECOLOGY

## 2024-04-08 NOTE — LETTER
April 8, 2024     Felipe Granado DO    Patient: Mookie Barker   YOB: 1965   Date of Visit: 4/8/2024       Dear Dr. Granado:    Mookie Barker was in today for her annual gyn exam. Below are my notes for this visit.    If you have questions, please do not hesitate to call me. I look forward to following your patient along with you.         Sincerely,        Domenica Miller MD        CC: No Recipients    Domenica Miller MD  4/8/2024 10:47 AM  Sign when Signing Visit  Assessment/Plan:    Encounter for gynecological examination (general) (routine) without abnormal findings  Here for well check,   Normal breast and pelvic exams.  Last pap 2/2022 neg/HPV neg; repeated today  Mammo order given, last 1/24/23  Colonoscopy 2016, due 2026  Dexa @66, no risk factors. Calcium recs reviewed.  Pelvic u/s ordered for h/o complex fluid in cervix. Of note, cervix patent today.       Diagnoses and all orders for this visit:    Encounter for gynecological examination (general) (routine) without abnormal findings    Encounter for screening mammogram for malignant neoplasm of breast  -     Mammo screening bilateral w 3d & cad; Future    Screening for malignant neoplasm of the cervix  -     IGP, Aptima HPV, Rfx 16/18,45    Abnormal ultrasound of pelvis  -     US pelvis complete w transvaginal; Future          Subjective:      Patient ID: Mookie Barker is a 58 y.o. female.    HPI Here for well check.    The following portions of the patient's history were reviewed and updated as appropriate: She  has a past medical history of Abnormal Pap smear of cervix (2020), Anemia, Anxiet, GERD (gastroesophageal reflux disease), Hypertension, MS (multiple sclerosis) (HCC), and Varicella.  She   Patient Active Problem List    Diagnosis Date Noted   • PMB (postmenopausal bleeding) 02/20/2023   • Lymphocytopenia 04/22/2019   • Anemia due to stage 3 chronic kidney disease  (HCC) 04/22/2019   • Multiple sclerosis (HCC) 04/22/2019     She  has a  past surgical history that includes Cholecystectomy (2004); ARTHROSCOPY KNEE (2005); Dilation and curettage of uterus (2018); Bone marrow biopsy (2019); Mammo (historical) (01/24/2023); Colposcopy (2019); and Colonoscopy (2016).  Her family history is not on file. She was adopted.  She  reports that she has never smoked. She has never used smokeless tobacco. She reports current alcohol use of about 4.0 standard drinks of alcohol per week. She reports that she does not use drugs.  Current Outpatient Medications   Medication Sig Dispense Refill   • ascorbic acid (VITAMIN C) 1000 MG tablet Take 1 tablet by mouth     • AVONEX PREFILLED 30 MCG/0.5ML PSKT      • Azelastine HCl 137 MCG/SPRAY SOLN SPRAY 2 SPRAYS INTO EACH NOSTRIL 2 (TWO) TIMES A DAY AS NEEDED FOR RHINITIS OR ALLERGIES 90 mL 1   • Calcium Carbonate-Vit D-Min (CALCIUM 1200 PO) Take by mouth     • diazepam (VALIUM) 2 mg tablet Take 2 mg by mouth daily 1 TABLET DAILY IN THE MORNING, 1 TABLET AT BEDTIME PRN  2   • escitalopram (LEXAPRO) 20 mg tablet Take 1 tablet (20 mg total) by mouth daily 30 tablet 11   • losartan (COZAAR) 50 mg tablet TAKE 1 TABLET BY MOUTH EVERY DAY 90 tablet 1   • mometasone (NASONEX) 50 mcg/act nasal spray SPRAY 1 SPRAY INTO EACH NOSTRIL TWICE A DAY 51 Act 1   • sodium chloride (OCEAN) 0.65 % nasal spray 1 spray into each nostril as needed for congestion     • valACYclovir (VALTREX) 1,000 mg tablet Take 1 tablet (1,000 mg total) by mouth 2 (two) times a day for 1 day 2 tablet 0   • Zinc Acetate 50 MG CAPS Take 1 tablet by mouth     • Epinastine HCl 0.05 % ophthalmic solution Apply 2 drops to eye 2 (two) times a day       No current facility-administered medications for this visit.     She is allergic to azithromycin, cephalosporins, ciprofloxacin, codeine, levofloxacin, penicillins, sulfamethoxazole-trimethoprim, tamiflu [oseltamivir], doxycycline, and lisinopril..    Review of Systems  No PMB, breast, bladder, bowel changes. No new  "persistent pain, bloating, early satiety or pelvic pressure      Objective:      /86 (BP Location: Left arm, Patient Position: Sitting, Cuff Size: Standard)   Ht 5' 6.5\" (1.689 m)   Wt 117 kg (258 lb 12.8 oz)   Breastfeeding No   BMI 41.15 kg/m²          Physical Exam    General appearance: no distress, pleasant  Neck: thyroid without nodules or thyromegaly, no palpable adenopathy  Lymph nodes: no palpable adenopathy  Breasts: no masses, nodes or skin changes  Abdomen: soft, non tender, no palpable masses  Pelvic exam: normal atrophic external genitalia, urethral meatus normal, vagina atrophic without lesions, cervix atrophic without lesions, open and able to obtain endocervical sampling with cytobrush, bimanual limited due to habitus, uterus small, non tender, no adnexal masses, non tender  Rectal exam: normal sphincter tone, no masses, RV confirms above    "

## 2024-04-08 NOTE — ASSESSMENT & PLAN NOTE
Here for well check,   Normal breast and pelvic exams.  Last pap 2/2022 neg/HPV neg; repeated today  Mammo order given, last 1/24/23  Colonoscopy 2016, due 2026  Dexa @66, no risk factors. Calcium recs reviewed.  Pelvic u/s ordered for h/o complex fluid in cervix. Of note, cervix patent today.

## 2024-04-10 ENCOUNTER — TELEPHONE (OUTPATIENT)
Age: 59
End: 2024-04-10

## 2024-04-10 ENCOUNTER — TELEPHONE (OUTPATIENT)
Dept: FAMILY MEDICINE CLINIC | Facility: CLINIC | Age: 59
End: 2024-04-10

## 2024-04-10 NOTE — TELEPHONE ENCOUNTER
Patient has decided to follow Dr. Granado to his new practice. Wanted to thank the office staff for the excellent care she received over the years.

## 2024-04-10 NOTE — TELEPHONE ENCOUNTER
Patient was contacted to schedule an appointment, she has follow Dr. Granado to his new practice.    Please assist with updating PCP field.

## 2024-04-12 LAB
CYTOLOGIST CVX/VAG CYTO: NORMAL
DX ICD CODE: NORMAL
HPV GENOTYPE REFLEX: NORMAL
HPV I/H RISK 4 DNA CVX QL PROBE+SIG AMP: NEGATIVE
Lab: NORMAL
OTHER STN SPEC: NORMAL
PATH REPORT.FINAL DX SPEC: NORMAL
SL AMB NOTE:: NORMAL
SL AMB SPECIMEN ADEQUACY: NORMAL
SL AMB TEST METHODOLOGY: NORMAL

## 2024-04-24 ENCOUNTER — HOSPITAL ENCOUNTER (OUTPATIENT)
Dept: HOSPITAL 99 - WDC | Age: 59
End: 2024-04-24
Payer: COMMERCIAL

## 2024-04-24 DIAGNOSIS — Z12.31: Primary | ICD-10-CM

## 2024-04-26 NOTE — TELEPHONE ENCOUNTER
04/26/24 11:24 AM        The office's request has been received, reviewed, and the patient chart updated. The PCP has successfully been removed with a patient attribution note. This message will now be completed.        Thank you  Reese Schwartz

## 2024-05-02 DIAGNOSIS — J30.1 ALLERGIC RHINITIS DUE TO POLLEN, UNSPECIFIED SEASONALITY: ICD-10-CM

## 2024-05-06 RX ORDER — MOMETASONE FUROATE 50 UG/1
SPRAY, METERED NASAL
Refills: 1 | OUTPATIENT
Start: 2024-05-06

## 2024-05-07 DIAGNOSIS — Z12.31 ENCOUNTER FOR SCREENING MAMMOGRAM FOR MALIGNANT NEOPLASM OF BREAST: ICD-10-CM

## 2024-05-22 ENCOUNTER — HOSPITAL ENCOUNTER (OUTPATIENT)
Dept: HOSPITAL 99 - RAD | Age: 59
End: 2024-05-22
Payer: COMMERCIAL

## 2024-05-22 DIAGNOSIS — R93.89: Primary | ICD-10-CM

## 2024-11-26 ENCOUNTER — HOSPITAL ENCOUNTER (OUTPATIENT)
Dept: HOSPITAL 99 - HWRAD | Age: 59
End: 2024-11-26
Payer: COMMERCIAL

## 2024-11-26 DIAGNOSIS — E03.9: ICD-10-CM

## 2024-11-26 DIAGNOSIS — R79.89: Primary | ICD-10-CM

## 2024-12-18 ENCOUNTER — PATIENT MESSAGE (OUTPATIENT)
Dept: OBGYN CLINIC | Facility: CLINIC | Age: 59
End: 2024-12-18

## 2024-12-18 DIAGNOSIS — L29.9 PRURITUS: Primary | ICD-10-CM

## 2024-12-23 RX ORDER — TRIAMCINOLONE ACETONIDE 0.25 MG/G
CREAM TOPICAL 2 TIMES DAILY
Qty: 15 G | Refills: 1 | Status: SHIPPED | OUTPATIENT
Start: 2024-12-23

## 2025-04-30 PROBLEM — N95.0 PMB (POSTMENOPAUSAL BLEEDING): Status: RESOLVED | Noted: 2023-02-20 | Resolved: 2025-04-30

## 2025-04-30 NOTE — ASSESSMENT & PLAN NOTE
Here for well check, doing great, off MS meds, no breast or gyn complaints.  Normal breast and pelvic exams.  Last pap 4/2024 neg/HPV neg; due 2027  Mammo order given, last 4/24/24 BIRADS 2B  Colonoscopy 2016, due 2026  Dexa @66, no risk factors. Calcium recs reviewed.   Triamcinolone ointment rx for intermittent itching

## 2025-04-30 NOTE — PROGRESS NOTES
Name: Mookie Barker      : 1965      MRN: [id number]  Encounter Provider: Domenica Miller MD  Encounter Date: 2025   Encounter department: [facility] [place]  :  Assessment & Plan  Encounter for gynecological examination (general) (routine) without abnormal findings  Here for well check, doing great, off MS meds, no breast or gyn complaints.  Normal breast and pelvic exams.  Last pap 2024 neg/HPV neg; due   Mammo order given, last 24 BIRADS 2B  Colonoscopy , due   Dexa @66, no risk factors. Calcium recs reviewed.   Triamcinolone ointment rx for intermittent itching       Encounter for screening mammogram for breast cancer    Orders:    Mammo screening bilateral w 3d and cad; Future    Pruritus    Orders:    triamcinolone (KENALOG) 0.025 % ointment; Apply topically 2 (two) times a day    History of Present Illness   HPI  Mookie Barker is a 59 y.o. female who presents for well check.    Review of Systems  No PMB, breast, bladder, bowel changes. No new persistent pain, bloating, early satiety or pelvic pressure    Past Medical History   Past Medical History:   Diagnosis Date    Abnormal Pap smear of cervix     Been a patient for many years see file    Allergic     Anemia     Anxiety     including health anxiety    GERD (gastroesophageal reflux disease)     Hypertension     MS (multiple sclerosis) (HCC)     Varicella     as a child     Past Surgical History:   Procedure Laterality Date    ARTHROSCOPY KNEE  2005    BONE MARROW BIOPSY  2019    CHOLECYSTECTOMY  2004    COLONOSCOPY      Due     COLPOSCOPY  2019    DILATION AND CURETTAGE OF UTERUS  2018    KNEE SURGERY       Family History   Adopted: Yes      reports that she has never smoked. She has never used smokeless tobacco. She reports current alcohol use of about 4.0 standard drinks of alcohol per week. She reports that she does not use drugs.  Current Outpatient Medications   Medication  "Instructions    ascorbic acid (VITAMIN C) 1000 MG tablet 1 tablet    Azelastine HCl 137 MCG/SPRAY SOLN SPRAY 2 SPRAYS INTO EACH NOSTRIL 2 (TWO) TIMES A DAY AS NEEDED FOR RHINITIS OR ALLERGIES    Calcium Carbonate-Vit D-Min (CALCIUM 1200 PO) Take by mouth    diazepam (VALIUM) 2 mg, Daily    Epinastine HCl 0.05 % ophthalmic solution 2 drops, 2 times daily    escitalopram (LEXAPRO) 20 mg, Oral, Daily    levothyroxine 75 mcg, Every 24 hours    losartan (COZAAR) 50 mg tablet TAKE 1 TABLET BY MOUTH EVERY DAY    mometasone (NASONEX) 50 mcg/act nasal spray SPRAY 1 SPRAY INTO EACH NOSTRIL TWICE A DAY    sodium chloride (OCEAN) 0.65 % nasal spray 1 spray, As needed    triamcinolone (KENALOG) 0.025 % ointment Topical, 2 times daily    valACYclovir (VALTREX) 1,000 mg, Oral, 2 times daily     Allergies   Allergen Reactions    Azithromycin GI Intolerance    Cephalosporins Hives and Itching    Ciprofloxacin Hives    Codeine     Levofloxacin Hives    Penicillins GI Intolerance    Sulfamethoxazole-Trimethoprim Hives     Bactrim    Tamiflu [Oseltamivir] GI Intolerance    Doxycycline Rash    Lisinopril Cough and Other (See Comments)     cough           Objective   /80 (BP Location: Left arm, Patient Position: Sitting, Cuff Size: Standard)   Ht 5' 6.5\" (1.689 m)   Wt 119 kg (261 lb 12.8 oz)   BMI 41.62 kg/m²      Physical Exam  General appearance: no distress, pleasant  Neck: thyroid without nodules or thyromegaly, no palpable adenopathy  Lymph nodes: no palpable adenopathy  Breasts: no masses, nodes or skin changes  Abdomen: soft, non tender, no palpable masses  Pelvic exam: normal atrophic external genitalia with few flat hyperpigmentations noted, urethral meatus normal, vagina atrophic without lesions, cervix atrophic without lesions, bimanual limited due to habitus,  uterus small, non tender, no adnexal masses, non tender  Rectal exam: normal sphincter tone, no masses, RV confirms above  "

## 2025-05-05 ENCOUNTER — ANNUAL EXAM (OUTPATIENT)
Dept: OBGYN CLINIC | Facility: CLINIC | Age: 60
End: 2025-05-05
Payer: COMMERCIAL

## 2025-05-05 VITALS
WEIGHT: 261.8 LBS | SYSTOLIC BLOOD PRESSURE: 134 MMHG | DIASTOLIC BLOOD PRESSURE: 80 MMHG | HEIGHT: 67 IN | BODY MASS INDEX: 41.09 KG/M2

## 2025-05-05 DIAGNOSIS — Z01.419 ENCOUNTER FOR GYNECOLOGICAL EXAMINATION (GENERAL) (ROUTINE) WITHOUT ABNORMAL FINDINGS: Primary | ICD-10-CM

## 2025-05-05 DIAGNOSIS — Z12.31 ENCOUNTER FOR SCREENING MAMMOGRAM FOR BREAST CANCER: ICD-10-CM

## 2025-05-05 DIAGNOSIS — L29.9 PRURITUS: ICD-10-CM

## 2025-05-05 PROCEDURE — 99396 PREV VISIT EST AGE 40-64: CPT | Performed by: OBSTETRICS & GYNECOLOGY

## 2025-05-05 RX ORDER — LEVOTHYROXINE SODIUM 75 UG/1
75 TABLET ORAL EVERY 24 HOURS
COMMUNITY
Start: 2025-02-26

## 2025-05-05 RX ORDER — TRIAMCINOLONE ACETONIDE 0.25 MG/G
OINTMENT TOPICAL 2 TIMES DAILY
Qty: 15 G | Refills: 1 | Status: SHIPPED | OUTPATIENT
Start: 2025-05-05